# Patient Record
Sex: MALE | Race: WHITE | NOT HISPANIC OR LATINO | Employment: FULL TIME | URBAN - METROPOLITAN AREA
[De-identification: names, ages, dates, MRNs, and addresses within clinical notes are randomized per-mention and may not be internally consistent; named-entity substitution may affect disease eponyms.]

---

## 2021-07-23 ENCOUNTER — APPOINTMENT (OUTPATIENT)
Dept: CT IMAGING | Facility: HOSPITAL | Age: 36
End: 2021-07-23

## 2021-07-23 ENCOUNTER — APPOINTMENT (OUTPATIENT)
Dept: ULTRASOUND IMAGING | Facility: HOSPITAL | Age: 36
End: 2021-07-23

## 2021-07-23 ENCOUNTER — HOSPITAL ENCOUNTER (EMERGENCY)
Facility: HOSPITAL | Age: 36
Discharge: HOME | End: 2021-07-23
Attending: EMERGENCY MEDICINE | Admitting: EMERGENCY MEDICINE

## 2021-07-23 VITALS
OXYGEN SATURATION: 98 % | DIASTOLIC BLOOD PRESSURE: 97 MMHG | SYSTOLIC BLOOD PRESSURE: 163 MMHG | TEMPERATURE: 98.9 F | WEIGHT: 285 LBS | BODY MASS INDEX: 36.57 KG/M2 | RESPIRATION RATE: 16 BRPM | HEIGHT: 74 IN | HEART RATE: 75 BPM

## 2021-07-23 DIAGNOSIS — L23.7 CONTACT DERMATITIS DUE TO RHUS TOXICODENDRON: ICD-10-CM

## 2021-07-23 DIAGNOSIS — N41.0 ACUTE PROSTATITIS: Primary | ICD-10-CM

## 2021-07-23 DIAGNOSIS — R82.81 PYURIA: ICD-10-CM

## 2021-07-23 LAB
BACTERIA UR QL AUTO: ABNORMAL /HPF
BILIRUB UR QL STRIP: NEGATIVE
CLARITY UR: CLEAR
COLOR UR: YELLOW
GLUCOSE UR STRIP-MCNC: NEGATIVE MG/DL
HGB UR QL STRIP.AUTO: ABNORMAL
HOLD SPECIMEN: NORMAL
HOLD SPECIMEN: NORMAL
HYALINE CASTS UR QL AUTO: ABNORMAL /LPF
KETONES UR QL STRIP: ABNORMAL
LEUKOCYTE ESTERASE UR QL STRIP.AUTO: ABNORMAL
NITRITE UR QL STRIP: NEGATIVE
PH UR STRIP.AUTO: 5.5 [PH] (ref 5–8)
PROT UR QL STRIP: NEGATIVE
RBC # UR: ABNORMAL /HPF
REF LAB TEST METHOD: ABNORMAL
SP GR UR STRIP: 1.02 (ref 1–1.03)
SQUAMOUS #/AREA URNS HPF: ABNORMAL /HPF
UROBILINOGEN UR QL STRIP: ABNORMAL
WBC UR QL AUTO: ABNORMAL /HPF
WHOLE BLOOD HOLD SPECIMEN: NORMAL

## 2021-07-23 PROCEDURE — 93976 VASCULAR STUDY: CPT

## 2021-07-23 PROCEDURE — 87186 SC STD MICRODIL/AGAR DIL: CPT | Performed by: EMERGENCY MEDICINE

## 2021-07-23 PROCEDURE — 87086 URINE CULTURE/COLONY COUNT: CPT | Performed by: EMERGENCY MEDICINE

## 2021-07-23 PROCEDURE — 87491 CHLMYD TRACH DNA AMP PROBE: CPT | Performed by: EMERGENCY MEDICINE

## 2021-07-23 PROCEDURE — 74176 CT ABD & PELVIS W/O CONTRAST: CPT

## 2021-07-23 PROCEDURE — 87591 N.GONORRHOEAE DNA AMP PROB: CPT | Performed by: EMERGENCY MEDICINE

## 2021-07-23 PROCEDURE — 76870 US EXAM SCROTUM: CPT

## 2021-07-23 PROCEDURE — 51798 US URINE CAPACITY MEASURE: CPT

## 2021-07-23 PROCEDURE — 25010000002 CEFTRIAXONE PER 250 MG: Performed by: EMERGENCY MEDICINE

## 2021-07-23 PROCEDURE — 96372 THER/PROPH/DIAG INJ SC/IM: CPT

## 2021-07-23 PROCEDURE — 87077 CULTURE AEROBIC IDENTIFY: CPT | Performed by: EMERGENCY MEDICINE

## 2021-07-23 PROCEDURE — 81001 URINALYSIS AUTO W/SCOPE: CPT | Performed by: EMERGENCY MEDICINE

## 2021-07-23 PROCEDURE — 99283 EMERGENCY DEPT VISIT LOW MDM: CPT

## 2021-07-23 RX ORDER — SODIUM CHLORIDE 0.9 % (FLUSH) 0.9 %
10 SYRINGE (ML) INJECTION AS NEEDED
Status: DISCONTINUED | OUTPATIENT
Start: 2021-07-23 | End: 2021-07-23 | Stop reason: HOSPADM

## 2021-07-23 RX ORDER — KETOROLAC TROMETHAMINE 10 MG/1
10 TABLET, FILM COATED ORAL EVERY 6 HOURS PRN
Qty: 20 TABLET | Refills: 0 | Status: SHIPPED | OUTPATIENT
Start: 2021-07-23 | End: 2022-03-02

## 2021-07-23 RX ORDER — CIPROFLOXACIN 500 MG/1
500 TABLET, FILM COATED ORAL 2 TIMES DAILY
Qty: 28 TABLET | Refills: 0 | Status: SHIPPED | OUTPATIENT
Start: 2021-07-23 | End: 2022-03-02

## 2021-07-23 RX ORDER — PHENAZOPYRIDINE HYDROCHLORIDE 200 MG/1
200 TABLET, FILM COATED ORAL 3 TIMES DAILY PRN
Qty: 9 TABLET | Refills: 0 | Status: SHIPPED | OUTPATIENT
Start: 2021-07-23 | End: 2022-03-02

## 2021-07-23 RX ORDER — PREDNISONE 10 MG/1
TABLET ORAL
Qty: 35 TABLET | Refills: 0 | Status: SHIPPED | OUTPATIENT
Start: 2021-07-23 | End: 2022-03-02

## 2021-07-23 RX ADMIN — SODIUM CHLORIDE, POTASSIUM CHLORIDE, SODIUM LACTATE AND CALCIUM CHLORIDE 1000 ML: 600; 310; 30; 20 INJECTION, SOLUTION INTRAVENOUS at 12:10

## 2021-07-23 RX ADMIN — LIDOCAINE HYDROCHLORIDE 250 MG: 10 INJECTION, SOLUTION EPIDURAL; INFILTRATION; INTRACAUDAL; PERINEURAL at 13:21

## 2021-07-23 NOTE — ED PROVIDER NOTES
Subjective   The patient presents to the emergency department with 1 week history of testicular pain, difficulty with urination, and suprapubic discomfort. Patient has had a couple of urinalysis performed demonstrating no clear source cardiology. The patient does report a prior history of kidney stones. The patient is in a monogamous relationship for the last 2+ years. No penile discharge. No fever or chills. No chest pain or shortness of breath.  The patient also reports some rash on his chest abdomen and groin region after working out in the garden earlier in the week.  Patient is concerned for possible poison ivy.      History provided by:  Patient      Review of Systems   Constitutional: Negative.    Respiratory: Negative.    Cardiovascular: Negative.    Gastrointestinal: Negative.    Genitourinary: Positive for difficulty urinating, dysuria and testicular pain. Negative for discharge, penile pain and penile swelling.   Musculoskeletal: Negative.    Skin: Negative.    Psychiatric/Behavioral: Negative.    All other systems reviewed and are negative.      Past Medical History:   Diagnosis Date   • Lumbar sprain        Allergies   Allergen Reactions   • Hydrocodone Other (See Comments) and GI Intolerance     vomiting   • Oxycodone Nausea And Vomiting       Past Surgical History:   Procedure Laterality Date   • TONSILLECTOMY         Family History   Problem Relation Age of Onset   • Hypertension Father        Social History     Socioeconomic History   • Marital status:      Spouse name: Not on file   • Number of children: Not on file   • Years of education: Not on file   • Highest education level: Not on file   Tobacco Use   • Smoking status: Never Smoker   • Smokeless tobacco: Never Used   Vaping Use   • Vaping Use: Never used   Substance and Sexual Activity   • Alcohol use: Yes     Comment: OCC   • Drug use: Never   • Sexual activity: Yes           Objective   Physical Exam  Vitals and nursing note  reviewed.   Constitutional:       Appearance: Normal appearance. He is obese.   Cardiovascular:      Rate and Rhythm: Normal rate and regular rhythm.      Pulses: Normal pulses.   Pulmonary:      Effort: Pulmonary effort is normal. No respiratory distress.      Breath sounds: Normal breath sounds.   Abdominal:      Palpations: Abdomen is soft.      Tenderness: There is no guarding.      Comments: Mild suprapubic tenderness. No guarding or surgical signs.   Genitourinary:     Comments: Normal external circumcised male genitalia. Bilateral testicular tenderness. No palpable hernias. No discharge.  Musculoskeletal:         General: Normal range of motion.   Skin:     General: Skin is warm and dry.      Comments: Patchy Rhus Toxicodendron rash of the chest, abdomen, and groin region.   Neurological:      General: No focal deficit present.      Mental Status: He is alert and oriented to person, place, and time.   Psychiatric:         Mood and Affect: Mood normal.         Behavior: Behavior normal.         Procedures           ED Course  ED Course as of Jul 23 1335 Fri Jul 23, 2021   1117 Leukocytes, UA(!): Moderate (2+) [RS]   1117 WBC, UA(!): Too Numerous to Count [RS]   1313 No emergent findings on her evaluation here in the ER.  Significant pyuria.  Potential for prostatitis.  We will plan to discharge patient home with symptomatic antibiotic management.  He is to follow-up with his doctor.  He is to return to the ER for any concerns or worsening.    [RS]      ED Course User Index  [RS] Lauro Horner MD                                           MDM  Number of Diagnoses or Management Options  Acute prostatitis  Contact dermatitis due to rhus toxicodendron  Pyuria  Diagnosis management comments: Recent Results (from the past 24 hour(s))  -POC Urinalysis Dipstick, Multipro (Automated dipstick)  Collection Time: 07/23/21  9:40 AM  Specimen: Urine       Result                      Value             Ref Range            Color                       Yellow            Yellow, Stra*       Clarity, UA                 Clear             Clear               Glucose, UA                 Negative          Negative, 10*       Bilirubin                   Negative          Negative            Ketones, UA                 Negative          Negative            Specific Gravity            1.030             1.005 - 1.030       Blood, UA                   Trace (A)         Negative            pH, Urine                   6.0               5.0 - 8.0           Protein, POC                Negative          Negative mg/*       Urobilinogen, UA            Normal            Normal              Nitrite, UA                 Negative          Negative            Leukocytes                  Negative          Negative       -Urinalysis With Microscopic If Indicated (No Culture) - Urine, Clean Catch  Collection Time: 07/23/21 10:23 AM  Specimen: Urine, Clean Catch       Result                      Value             Ref Range           Color, UA                   Yellow            Yellow, Straw       Appearance, UA              Clear             Clear               pH, UA                      5.5               5.0 - 8.0           Specific [place], UA        1.025             1.001 - 1.030       Glucose, UA                 Negative          Negative            Ketones, UA                 Trace (A)         Negative            Bilirubin, UA               Negative          Negative            Blood, UA                   Trace (A)         Negative            Protein, UA                 Negative          Negative            Leuk Esterase, UA                             Negative        Moderate (2+) (A)       Nitrite, UA                 Negative          Negative            Urobilinogen, UA            1.0 E.U./dL       0.2 - 1.0 E.*  -Urinalysis, Microscopic Only - Urine, Clean Catch  Collection Time: 07/23/21 10:23 AM  Specimen: Urine, Clean Catch       Result                       Value             Ref Range           RBC, UA                     0-2               None Seen, 0*       WBC, UA                                       None Seen, 0*   Too Numerous to Count (A)       Bacteria, UA                Trace             None Seen, T*       Squamous Epithelial Ce*     None Seen         None Seen, 0*       Hyaline Casts, UA           21-30             0 - 6 /LPF          Methodology                                                   Automated Microscopy  -Green Top (Gel)  Collection Time: 07/23/21 10:29 AM       Result                      Value             Ref Range           Extra Tube                                                    Hold for add-ons.  -Lavender Top  Collection Time: 07/23/21 10:29 AM       Result                      Value             Ref Range           Extra Tube                                                    hold for add-on  -Gold Top - SST  Collection Time: 07/23/21 10:29 AM       Result                      Value             Ref Range           Extra Tube                                                    Hold for add-ons.  Note: In addition to lab results from this visit, the labs listed above may include labs taken at another facility or during a different encounter within the last 24 hours. Please correlate lab times with ED admission and discharge times for further clarification of the services performed during this visit.    CT Abdomen Pelvis Without Contrast   Preliminary Result    1. No obstructing uropathy or urolithiasis/nephrolithiasis.    2. Small fat-containing left direct inguinal hernia.         D:  07/23/2021    E:  07/23/2021               US Testicular or Ovarian Vascular Limited   Preliminary Result    No intratesticular ischemia or hyperemia to suggest acute    inflammation with small left hydrocele.          D:  07/23/2021    E:  07/23/2021               US Scrotum & Testicles   Preliminary Result    No intratesticular ischemia  "or hyperemia to suggest acute    inflammation with small left hydrocele.          D:  07/23/2021    E:  07/23/2021               ------------------------------------------------------------               07/23/21 07/23/21 07/23/21 07/23/21                  1005          1030      1100      1230     ------------------------------------------------------------   BP:        (!) 143/121     139/96    160/94    163/97     Pulse:         75                                         Resp:          16                                         Temp:   98.9 °F (37.2 °C)                                 TempSrc:      Oral                                        SpO2:          99%          98%       96%       98%       Weight:  129 kg (285 lb)                                  Height:   188 cm (74\")                                   ------------------------------------------------------------  Medications  sodium chloride 0.9 % flush 10 mL (has no administration in time range)  sodium chloride 0.9 % flush 10 mL (has no administration in time range)  lactated ringers bolus 1,000 mL (0 mL Intravenous Stopped 7/23/21 1304)  cefTRIAXone (ROCEPHIN) 250 mg/ml in lidocaine 1% IM syringe (250 mg vial) (250 mg Intramuscular Given 7/23/21 1321)  ECG/EMG Results (last 24 hours)     ** No results found for the last 24 hours. **      No orders to display         Amount and/or Complexity of Data Reviewed  Clinical lab tests: reviewed  Tests in the radiology section of CPT®: reviewed  Independent visualization of images, tracings, or specimens: yes        Final diagnoses:   Acute prostatitis   Pyuria   Contact dermatitis due to rhus toxicodendron       ED Disposition  ED Disposition     ED Disposition Condition Comment    Discharge Stable           [facility] Emergency Department  [address] [address] [phone]    As needed, " If symptoms worsen or ANY concerns.    PATIENT CONNECTION - [facility] [address]  [phone]  Schedule an appointment as soon as possible for a visit       Rusty Ledbetter MD  [facility] [place] [place] [address]  [phone]    In 1 week  If not better/resolved.         Medication List      New Prescriptions    ciprofloxacin 500 MG tablet  Commonly known as: CIPRO  Take 1 tablet by mouth 2 (Two) Times a Day.     ketorolac 10 MG tablet  Commonly known as: TORADOL  Take 1 tablet by mouth Every 6 (Six) Hours As Needed for Moderate Pain . Received a dose in the ER.     phenazopyridine 200 MG tablet  Commonly known as: PYRIDIUM  Take 1 tablet by mouth 3 (Three) Times a Day As Needed for Bladder Spasms.     predniSONE 10 MG tablet  Commonly known as: DELTASONE  40mg PO daily for 5 days, then 20mg PO daily for 5 days, then 10mg PO daily for 5 days        Changed    albuterol (2.5 MG/3ML) 0.083% nebulizer solution  Commonly known as: PROVENTIL  What changed: Another medication with the same name was removed. Continue taking this medication, and follow the directions you see here.           Where to Get Your Medications      These medications were sent to [id number] - [place], [place] - [address] - [phone]  - [phone]   [address] [address]    Phone: [phone]   · ciprofloxacin 500 MG tablet  · ketorolac 10 MG tablet  · phenazopyridine 200 MG tablet  · predniSONE 10 MG tablet          Lauro Horner MD  07/23/21 2235

## 2021-07-25 LAB — BACTERIA SPEC AEROBE CULT: ABNORMAL

## 2021-07-26 LAB
C TRACH RRNA SPEC QL NAA+PROBE: NEGATIVE
N GONORRHOEA RRNA SPEC QL NAA+PROBE: NEGATIVE

## 2022-03-02 ENCOUNTER — TELEPHONE (OUTPATIENT)
Dept: INTERNAL MEDICINE | Facility: CLINIC | Age: 37
End: 2022-03-02

## 2022-03-02 ENCOUNTER — OFFICE VISIT (OUTPATIENT)
Dept: INTERNAL MEDICINE | Facility: CLINIC | Age: 37
End: 2022-03-02

## 2022-03-02 ENCOUNTER — PATIENT MESSAGE (OUTPATIENT)
Dept: INTERNAL MEDICINE | Facility: CLINIC | Age: 37
End: 2022-03-02

## 2022-03-02 ENCOUNTER — LAB (OUTPATIENT)
Dept: LAB | Facility: HOSPITAL | Age: 37
End: 2022-03-02

## 2022-03-02 VITALS
SYSTOLIC BLOOD PRESSURE: 138 MMHG | OXYGEN SATURATION: 98 % | DIASTOLIC BLOOD PRESSURE: 96 MMHG | HEART RATE: 70 BPM | TEMPERATURE: 97.5 F | BODY MASS INDEX: 40.57 KG/M2 | WEIGHT: 315 LBS

## 2022-03-02 DIAGNOSIS — M54.50 CHRONIC MIDLINE LOW BACK PAIN WITHOUT SCIATICA: ICD-10-CM

## 2022-03-02 DIAGNOSIS — G89.29 CHRONIC MIDLINE LOW BACK PAIN WITHOUT SCIATICA: ICD-10-CM

## 2022-03-02 DIAGNOSIS — M19.90 ARTHRITIS: Primary | ICD-10-CM

## 2022-03-02 DIAGNOSIS — I10 PRIMARY HYPERTENSION: ICD-10-CM

## 2022-03-02 DIAGNOSIS — Z11.59 NEED FOR HEPATITIS C SCREENING TEST: ICD-10-CM

## 2022-03-02 DIAGNOSIS — M19.90 ARTHRITIS: ICD-10-CM

## 2022-03-02 DIAGNOSIS — I10 PRIMARY HYPERTENSION: Primary | ICD-10-CM

## 2022-03-02 LAB
BASOPHILS # BLD AUTO: 0.03 10*3/MM3 (ref 0–0.2)
BASOPHILS NFR BLD AUTO: 0.4 % (ref 0–1.5)
DEPRECATED RDW RBC AUTO: 44.2 FL (ref 37–54)
EOSINOPHIL # BLD AUTO: 0.18 10*3/MM3 (ref 0–0.4)
EOSINOPHIL NFR BLD AUTO: 2.5 % (ref 0.3–6.2)
ERYTHROCYTE [DISTWIDTH] IN BLOOD BY AUTOMATED COUNT: 13.5 % (ref 12.3–15.4)
ERYTHROCYTE [SEDIMENTATION RATE] IN BLOOD: 13 MM/HR (ref 0–15)
HCT VFR BLD AUTO: 49.9 % (ref 37.5–51)
HGB BLD-MCNC: 16.8 G/DL (ref 13–17.7)
IMM GRANULOCYTES # BLD AUTO: 0.02 10*3/MM3 (ref 0–0.05)
IMM GRANULOCYTES NFR BLD AUTO: 0.3 % (ref 0–0.5)
LYMPHOCYTES # BLD AUTO: 2.15 10*3/MM3 (ref 0.7–3.1)
LYMPHOCYTES NFR BLD AUTO: 29.4 % (ref 19.6–45.3)
MCH RBC QN AUTO: 30.2 PG (ref 26.6–33)
MCHC RBC AUTO-ENTMCNC: 33.7 G/DL (ref 31.5–35.7)
MCV RBC AUTO: 89.6 FL (ref 79–97)
MONOCYTES # BLD AUTO: 0.67 10*3/MM3 (ref 0.1–0.9)
MONOCYTES NFR BLD AUTO: 9.2 % (ref 5–12)
NEUTROPHILS NFR BLD AUTO: 4.26 10*3/MM3 (ref 1.7–7)
NEUTROPHILS NFR BLD AUTO: 58.2 % (ref 42.7–76)
NRBC BLD AUTO-RTO: 0 /100 WBC (ref 0–0.2)
PLATELET # BLD AUTO: 263 10*3/MM3 (ref 140–450)
PMV BLD AUTO: 10 FL (ref 6–12)
RBC # BLD AUTO: 5.57 10*6/MM3 (ref 4.14–5.8)
WBC NRBC COR # BLD: 7.31 10*3/MM3 (ref 3.4–10.8)

## 2022-03-02 PROCEDURE — 99204 OFFICE O/P NEW MOD 45 MIN: CPT | Performed by: FAMILY MEDICINE

## 2022-03-02 PROCEDURE — 80053 COMPREHEN METABOLIC PANEL: CPT | Performed by: FAMILY MEDICINE

## 2022-03-02 PROCEDURE — 84550 ASSAY OF BLOOD/URIC ACID: CPT | Performed by: FAMILY MEDICINE

## 2022-03-02 PROCEDURE — 81374 HLA I TYPING 1 ANTIGEN LR: CPT | Performed by: FAMILY MEDICINE

## 2022-03-02 PROCEDURE — 80061 LIPID PANEL: CPT | Performed by: FAMILY MEDICINE

## 2022-03-02 PROCEDURE — 36415 COLL VENOUS BLD VENIPUNCTURE: CPT

## 2022-03-02 PROCEDURE — 85025 COMPLETE CBC W/AUTO DIFF WBC: CPT | Performed by: FAMILY MEDICINE

## 2022-03-02 PROCEDURE — 85652 RBC SED RATE AUTOMATED: CPT | Performed by: FAMILY MEDICINE

## 2022-03-02 PROCEDURE — 86431 RHEUMATOID FACTOR QUANT: CPT | Performed by: FAMILY MEDICINE

## 2022-03-02 PROCEDURE — 84443 ASSAY THYROID STIM HORMONE: CPT | Performed by: FAMILY MEDICINE

## 2022-03-02 PROCEDURE — 86803 HEPATITIS C AB TEST: CPT | Performed by: FAMILY MEDICINE

## 2022-03-02 PROCEDURE — 86038 ANTINUCLEAR ANTIBODIES: CPT | Performed by: FAMILY MEDICINE

## 2022-03-02 RX ORDER — LISINOPRIL 10 MG/1
10 TABLET ORAL DAILY
Qty: 30 TABLET | Refills: 1 | Status: SHIPPED | OUTPATIENT
Start: 2022-03-02 | End: 2022-03-02

## 2022-03-02 RX ORDER — LOSARTAN POTASSIUM 25 MG/1
25 TABLET ORAL DAILY
Qty: 30 TABLET | Refills: 1 | Status: SHIPPED | OUTPATIENT
Start: 2022-03-02 | End: 2022-03-30 | Stop reason: DRUGHIGH

## 2022-03-02 NOTE — TELEPHONE ENCOUNTER
Caller: Serge Narayanan    Relationship: Self    Best call back number: [phone]    What was the call regarding:   PATIENT STATED THAT HE WAS SEEN IN THE OFFICE TODAY 03/02/2022 AND PRESCRIBED MEDICATION   lisinopril (PRINIVIL,ZESTRIL) 10 MG tablet  PATIENT STATED THAT THIS MEDICATION WAS PRESCRIBED TO HIS MOTHER AND BROTHER THAT BOTH HAVE ASTHMA AS WELL AND THIS MEDICATION CAUSED THEM COUGH A LOT AND PATIENT WOULD LIKE FOR THIS MEDICATION TO BE CHANGED TO A DIFFERENT MEDICATION IF POSSIBLE     Do you require a callback: YES

## 2022-03-02 NOTE — PROGRESS NOTES
Subjective   Serge Narayanan is a 36 y.o. male.     Chief Complaint   Patient presents with   • Hypertension     ESTABLISH CARE.  Needs lab to check for AS.  currently seeing ortho for back injury.   • Back Injury       Visit Vitals  /96 (BP Location: Left arm, Patient Position: Sitting, Cuff Size: Large Adult)   Pulse 70   Temp 97.5 °F (36.4 °C)   Wt (!) 143 kg (316 lb)   SpO2 98%   BMI 40.57 kg/m²       Vitals:    03/02/22 1019 03/02/22 1048   BP: 128/90 138/96   BP Location:  Left arm   Patient Position:  Sitting   Cuff Size:  Large Adult   Pulse: 70    Temp: 97.5 °F (36.4 °C)    SpO2: 98%    Weight: (!) 143 kg (316 lb)      Wt Readings from Last 3 Encounters:   03/02/22 (!) 143 kg (316 lb)   07/23/21 129 kg (285 lb)   07/23/21 129 kg (285 lb)       History of Present Illness   Pt here to establish care  Pt as been told at multiple locations that his blood pressure is elevated.     Pt donates blood and plasma and his blood pressure is high there.  Pt had back injury at work and Dr Sparks his ortho recommended testing for Ankylosing Spondylitis.  Pt has pain in back, knees, hips and ankles. Pt goes to [facility] and sees 4 of the doctors there. Pt has high blood pressure at all his ortho visits.     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 Pt wears hearing aids.   The following portions of the patient's history were reviewed and updated as appropriate: allergies, current medications, past family history, past medical history, past social history, past surgical history and problem list.    Past Medical History:   Diagnosis Date   • Arthritis    • Asthma    • Kidney stone    • Lumbar sprain    • Personal history of COVID-19 01/2021      Past Surgical History:   Procedure Laterality Date   • TONSILLECTOMY  2011      Family History   Problem Relation Age of Onset   • Hypertension Father    • Heart attack Father         related to  Covid   • Migraine headaches Mother       Social History     Socioeconomic History   • Marital status:    Tobacco Use   • Smoking status: Never Smoker   • Smokeless tobacco: Never Used   Vaping Use   • Vaping Use: Never used   Substance and Sexual Activity   • Alcohol use: Yes     Comment: OCC   • Drug use: Never   • Sexual activity: Yes      Allergies   Allergen Reactions   • Hydrocodone Other (See Comments) and GI Intolerance     vomiting   • Oxycodone Nausea And Vomiting       Review of Systems   HENT: Positive for hearing loss and tinnitus.    Musculoskeletal: Positive for arthralgias and back pain.       Objective   Physical Exam  Vitals and nursing note reviewed.   Constitutional:       Appearance: He is well-developed.   HENT:      Head: Normocephalic.      Right Ear: External ear normal.      Left Ear: External ear normal.      Ears:      Comments: Bilateral hearing aids     Nose: Nose normal.   Eyes:      General: Lids are normal.      Conjunctiva/sclera: Conjunctivae normal.      Pupils: Pupils are equal, round, and reactive to light.   Neck:      Thyroid: No thyroid mass or thyromegaly.      Vascular: No carotid bruit.      Trachea: Trachea normal.   Cardiovascular:      Rate and Rhythm: Normal rate and regular rhythm.      Heart sounds: No murmur heard.      Pulmonary:      Effort: Pulmonary effort is normal. No respiratory distress.      Breath sounds: Normal breath sounds. No decreased breath sounds, wheezing, rhonchi or rales.   Chest:      Chest wall: No tenderness.   Abdominal:      General: Bowel sounds are normal.      Palpations: Abdomen is soft.      Tenderness: There is no abdominal tenderness.   Musculoskeletal:         General: Normal range of motion.      Cervical back: Normal range of motion and neck supple.      Lumbar back: Tenderness and bony tenderness present.        Back:    Skin:     General: Skin is warm and dry.   Neurological:      Mental Status: He is alert and oriented to  person, place, and time.   Psychiatric:         Behavior: Behavior normal.         Assessment/Plan   Diagnoses and all orders for this visit:    1. Arthritis (Primary)  -     HLA-B27 Antigen; Future  -     Sedimentation Rate; Future  -     Uric Acid; Future  -     Rheumatoid Factor; Future  -     Nuclear Antigen Antibody, IFA; Future  -     CBC & Differential; Future    2. Chronic midline low back pain without sciatica  -     HLA-B27 Antigen; Future  -     Sedimentation Rate; Future  -     Uric Acid; Future  -     Rheumatoid Factor; Future  -     Nuclear Antigen Antibody, IFA; Future  -     CBC & Differential; Future  -     Comprehensive Metabolic Panel; Future    3. Primary hypertension  -     CBC & Differential; Future  -     Comprehensive Metabolic Panel; Future  -     TSH Rfx On Abnormal To Free T4; Future  -     Lipid Panel; Future  -     lisinopril (PRINIVIL,ZESTRIL) 10 MG tablet; Take 1 tablet by mouth Daily.  Dispense: 30 tablet; Refill: 1    4. Need for hepatitis C screening test  -     HCV Antibody Rfx To Qnt PCR; Future      Start lisinopril 10 mg daily.  Watch for cough.  Will check lab for  hypertension as well as for arthritis and back pain.             Current Outpatient Medications:   •  albuterol (PROVENTIL) (2.5 MG/3ML) 0.083% nebulizer solution, , Disp: , Rfl:   •  lisinopril (PRINIVIL,ZESTRIL) 10 MG tablet, Take 1 tablet by mouth Daily., Disp: 30 tablet, Rfl: 1    Return in about 4 weeks (around 3/30/2022), or if symptoms worsen or fail to improve, for Recheck bp.

## 2022-03-02 NOTE — PATIENT INSTRUCTIONS
Sacroiliac Joint Dysfunction    Sacroiliac joint dysfunction is a condition that causes inflammation on one or both sides of the sacroiliac (SI) joint. The SI joint is the joint between two bones of the pelvis called the sacrum and the ilium. The sacrum is the bone at the base of the spine. The ilium is the large bone that forms the hip. This condition causes deep aching or burning pain in the low back. In some cases, the pain may also spread into one or both buttocks, hips, or thighs.  What are the causes?  This condition may be caused by:  · Pregnancy. During pregnancy, extra stress is put on the SI joints because the pelvis widens.  · Injury, such as:  ? Injuries from car crashes.  ? Sports-related injuries.  ? Work-related injuries.  · Having one leg that is shorter than the other.  · Conditions that affect the joints, such as:  ? Rheumatoid arthritis.  ? Gout.  ? Psoriatic arthritis.  ? Joint infection (septic arthritis).  Sometimes, the cause of SI joint dysfunction is not known.  What are the signs or symptoms?  Symptoms of this condition include:  · Aching or burning pain in the lower back. The pain may also spread to other areas, such as:  ? Buttocks.  ? Groin.  ? Thighs.  · Muscle spasms in or around the painful areas.  · Increased pain when standing, walking, running, stair climbing, bending, or lifting.  How is this diagnosed?  This condition is diagnosed with a physical exam and your medical history. During the exam, the health care provider may move one or both of your legs to different positions to check for pain. Various tests may be done to confirm the diagnosis, including:  · Imaging tests to look for other causes of pain. These may include:  ? MRI.  ? CT scan.  ? Bone scan.  · Diagnostic injection. A numbing medicine is injected into the SI joint using a needle. If your pain is temporarily improved or stopped after the injection, this can indicate that SI joint dysfunction is the problem.  How is  this treated?  Treatment depends on the cause and severity of your condition. Treatment options can be noninvasive and may include:  · Ice or heat applied to the lower back area after an injury. This may help reduce pain and muscle spasms.  · Medicines to relieve pain or inflammation or to relax the muscles.  · Wearing a back brace (sacroiliac brace) to help support the joint while your back is healing.  · Physical therapy to increase muscle strength around the joint and flexibility at the joint. This may also involve learning proper body positions and ways of moving to relieve stress on the joint.  · Direct manipulation of the SI joint.  · Use of a device that provides electrical stimulation to help reduce pain at the joint.  Other treatments may include:  · Injections of steroid medicine into the joint to reduce pain and swelling.  · Radiofrequency ablation. This treatment uses heat to burn away nerves that are carrying pain messages from the joint.  · Surgery to put in screws and plates that limit or prevent joint motion. This is rare.  Follow these instructions at home:  Medicines  · Take over-the-counter and prescription medicines only as told by your health care provider.  · Ask your health care provider if the medicine prescribed to you:  ? Requires you to avoid driving or using machinery.  ? Can cause constipation. You may need to take these actions to prevent or treat constipation:  § Drink enough fluid to keep your urine pale yellow.  § Take over-the-counter or prescription medicines.  § Eat foods that are high in fiber, such as beans, whole grains, and fresh fruits and vegetables.  § Limit foods that are high in fat and processed sugars, such as fried or sweet foods.  If you have a brace:  · Wear the brace as told by your health care provider. Remove it only as told by your health care provider.  · Keep the brace clean.  · If the brace is not waterproof:  ? Do not let it get wet.  ? Cover it with a  watertight covering when you take a bath or a shower.  Managing pain, stiffness, and swelling         · Icing can help with pain and swelling. Heat may help with muscle tension or spasms. Ask your health care provider if you should use ice or heat.  · If directed, put ice on the affected area:  ? If you have a removable brace, remove it as told by your health care provider.  ? Put ice in a plastic bag.  ? Place a towel between your skin and the bag.  ? Leave the ice on for 20 minutes, 2-3 times a day.  ? Remove the ice if your skin turns bright red. This is very important. If you cannot feel pain, heat, or cold, you have a greater risk of damage to the area.  · If directed, apply heat to the affected area as often as told by your health care provider. Use the heat source that your health care provider recommends, such as a moist heat pack or a heating pad.  ? Place a towel between your skin and the heat source.  ? Leave the heat on for 20-30 minutes.  ? Remove the heat if your skin turns bright red. This is especially important if you are unable to feel pain, heat, or cold. You may have a greater risk of getting burned.  General instructions  · Rest as needed. Return to your normal activities as told by your health care provider. Ask your health care provider what activities are safe for you.  · Do exercises as told by your health care provider or physical therapist.  · Keep all follow-up visits. This is important.  Contact a health care provider if:  · Your pain is not controlled with medicine.  · You have a fever.  · Your pain is getting worse.  Get help right away if:  · You have weakness, numbness, or tingling in your legs or feet.  · You lose control of your bladder or bowels.  Summary  · Sacroiliac (SI) joint dysfunction is a condition that causes inflammation on one or both sides of the SI joint.  · This condition causes deep aching or burning pain in the low back. In some cases, the pain may also spread into  one or both buttocks, hips, or thighs.  · Treatment depends on the cause and severity of your condition. It may include medicines to reduce pain and swelling or to relax muscles.  This information is not intended to replace advice given to you by your health care provider. Make sure you discuss any questions you have with your health care provider.  Document Revised: 04/29/2021 Document Reviewed: 04/29/2021  [employer]vier Patient Education © 2021 [employer]

## 2022-03-02 NOTE — TELEPHONE ENCOUNTER
Kinsey Ervin 3/2/2022 5:48 PM EST      ----- Message -----  From: Serge Narayanan  Sent: 3/2/2022 5:28 PM EST  To: [facility] [place] [address] Clinical Pool  Subject: Changing Prescription     I don’t know if you were able to receive the message I had called in regarding the coughing issue with the medication I was prescribed for my blood pressure. My Mother and Brother both took it at first and it was a horrible experience for them since they had/have asthma. Is there a way to just negate the one prescribed to me and change it to a different type so that I don’t have the coughing fits?     Thank you     Kodi

## 2022-03-03 ENCOUNTER — PATIENT ROUNDING (BHMG ONLY) (OUTPATIENT)
Dept: INTERNAL MEDICINE | Facility: CLINIC | Age: 37
End: 2022-03-03

## 2022-03-03 LAB
ALBUMIN SERPL-MCNC: 4.6 G/DL (ref 3.5–5.2)
ALBUMIN/GLOB SERPL: 1.6 G/DL
ALP SERPL-CCNC: 111 U/L (ref 39–117)
ALT SERPL W P-5'-P-CCNC: 36 U/L (ref 1–41)
ANION GAP SERPL CALCULATED.3IONS-SCNC: 14.4 MMOL/L (ref 5–15)
AST SERPL-CCNC: 26 U/L (ref 1–40)
BILIRUB SERPL-MCNC: 0.7 MG/DL (ref 0–1.2)
BUN SERPL-MCNC: 12 MG/DL (ref 6–20)
BUN/CREAT SERPL: 12.1 (ref 7–25)
CALCIUM SPEC-SCNC: 9.8 MG/DL (ref 8.6–10.5)
CHLORIDE SERPL-SCNC: 104 MMOL/L (ref 98–107)
CHOLEST SERPL-MCNC: 232 MG/DL (ref 0–200)
CHROMATIN AB SERPL-ACNC: <10 IU/ML (ref 0–14)
CO2 SERPL-SCNC: 22.6 MMOL/L (ref 22–29)
CREAT SERPL-MCNC: 0.99 MG/DL (ref 0.76–1.27)
EGFRCR SERPLBLD CKD-EPI 2021: 102.5 ML/MIN/1.73
GLOBULIN UR ELPH-MCNC: 2.9 GM/DL
GLUCOSE SERPL-MCNC: 98 MG/DL (ref 65–99)
HCV AB S/CO SERPL IA: <0.1 S/CO RATIO (ref 0–0.9)
HCV AB SERPL QL IA: NORMAL
HDLC SERPL-MCNC: 51 MG/DL (ref 40–60)
LDLC SERPL CALC-MCNC: 158 MG/DL (ref 0–100)
LDLC/HDLC SERPL: 3.05 {RATIO}
POTASSIUM SERPL-SCNC: 4.5 MMOL/L (ref 3.5–5.2)
PROT SERPL-MCNC: 7.5 G/DL (ref 6–8.5)
SODIUM SERPL-SCNC: 141 MMOL/L (ref 136–145)
TRIGL SERPL-MCNC: 127 MG/DL (ref 0–150)
TSH SERPL DL<=0.05 MIU/L-ACNC: 1.05 UIU/ML (ref 0.27–4.2)
URATE SERPL-MCNC: 7.4 MG/DL (ref 3.4–7)
VLDLC SERPL-MCNC: 23 MG/DL (ref 5–40)

## 2022-03-03 NOTE — PROGRESS NOTES
A eMindful message has been sent to the patient for patient rounding with [facility]

## 2022-03-06 LAB — ANA TITR SER IF: NEGATIVE {TITER}

## 2022-03-11 LAB — HLA-B27 QL NAA+PROBE: NEGATIVE

## 2022-03-30 ENCOUNTER — OFFICE VISIT (OUTPATIENT)
Dept: INTERNAL MEDICINE | Facility: CLINIC | Age: 37
End: 2022-03-30

## 2022-03-30 VITALS
HEART RATE: 80 BPM | SYSTOLIC BLOOD PRESSURE: 140 MMHG | WEIGHT: 315 LBS | HEIGHT: 74 IN | DIASTOLIC BLOOD PRESSURE: 90 MMHG | BODY MASS INDEX: 40.43 KG/M2 | TEMPERATURE: 98 F | OXYGEN SATURATION: 98 %

## 2022-03-30 DIAGNOSIS — I10 PRIMARY HYPERTENSION: Primary | ICD-10-CM

## 2022-03-30 DIAGNOSIS — E66.01 CLASS 3 SEVERE OBESITY WITH SERIOUS COMORBIDITY AND BODY MASS INDEX (BMI) OF 40.0 TO 44.9 IN ADULT, UNSPECIFIED OBESITY TYPE: ICD-10-CM

## 2022-03-30 DIAGNOSIS — E78.00 PURE HYPERCHOLESTEROLEMIA: ICD-10-CM

## 2022-03-30 PROCEDURE — 99214 OFFICE O/P EST MOD 30 MIN: CPT | Performed by: FAMILY MEDICINE

## 2022-03-30 RX ORDER — LOSARTAN POTASSIUM 50 MG/1
50 TABLET ORAL DAILY
Qty: 30 TABLET | Refills: 2 | Status: SHIPPED | OUTPATIENT
Start: 2022-03-30 | End: 2022-06-27

## 2022-03-30 NOTE — PROGRESS NOTES
"Subjective   Serge Narayanan is a 36 y.o. male.     Chief Complaint   Patient presents with   • Hypertension              Visit Vitals  /90 (BP Location: Left arm, Patient Position: Sitting, Cuff Size: Large Adult)   Pulse 80   Temp 98 °F (36.7 °C)   Ht 188 cm (74\")   Wt (!) 145 kg (319 lb)   SpO2 98%   BMI 40.96 kg/m²       Wt Readings from Last 3 Encounters:   03/30/22 (!) 145 kg (319 lb)   03/02/22 (!) 143 kg (316 lb)   07/23/21 129 kg (285 lb)       Hypertension  This is a recurrent problem. The current episode started 1 to 4 weeks ago. The problem is unchanged. The problem is controlled. Associated symptoms include headaches and malaise/fatigue. Pertinent negatives include no anxiety, blurred vision, chest pain, neck pain, orthopnea, palpitations, peripheral edema, PND, shortness of breath or sweats. There are no associated agents to hypertension. Risk factors for coronary artery disease include stress. Current antihypertension treatment includes angiotensin blockers. The current treatment provides moderate improvement. There are no compliance problems.  There is no history of angina, kidney disease, CAD/MI, CVA, heart failure, left ventricular hypertrophy, PVD or retinopathy. There is no history of sleep apnea or a thyroid problem.      Pt has been off work for back injury at work.  Pt has not been at work for 5 months and was on reduced hours since February.   Pt has been taking losartan without problem.     The following portions of the patient's history were reviewed and updated as appropriate: allergies, current medications, past family history, past medical history, past social history, past surgical history and problem list.    Past Medical History:   Diagnosis Date   • Arthritis    • Asthma    • Kidney stone    • Low back pain    • Lumbar sprain    • Personal history of COVID-19 01/2021   • Pure hypercholesterolemia 3/30/2022      Past Surgical History:   Procedure Laterality Date   • " TONSILLECTOMY  2011      Family History   Problem Relation Age of Onset   • Hypertension Father    • Heart attack Father         related to Covid   • Arthritis Father    • Migraine headaches Mother    • Arthritis Mother    • Asthma Mother       Social History     Socioeconomic History   • Marital status:    Tobacco Use   • Smoking status: Never Smoker   • Smokeless tobacco: Never Used   Vaping Use   • Vaping Use: Never used   Substance and Sexual Activity   • Alcohol use: Yes     Comment: OCC   • Drug use: Never   • Sexual activity: Yes     Partners: Male     Birth control/protection: None      Allergies   Allergen Reactions   • Hydrocodone Other (See Comments) and GI Intolerance     vomiting   • Oxycodone Nausea And Vomiting       Review of Systems   Constitutional: Positive for malaise/fatigue.   Eyes: Negative for blurred vision.   Respiratory: Negative for shortness of breath.    Cardiovascular: Negative for chest pain, palpitations, orthopnea and PND.   Musculoskeletal: Negative for neck pain.   Neurological: Positive for headaches.       Objective   Physical Exam  Vitals and nursing note reviewed.   Constitutional:       Appearance: He is well-developed.   HENT:      Head: Normocephalic.      Right Ear: External ear normal.      Left Ear: External ear normal.      Nose: Nose normal.   Eyes:      General: Lids are normal.      Conjunctiva/sclera: Conjunctivae normal.      Pupils: Pupils are equal, round, and reactive to light.   Neck:      Thyroid: No thyroid mass or thyromegaly.      Vascular: No carotid bruit.      Trachea: Trachea normal.   Cardiovascular:      Rate and Rhythm: Normal rate and regular rhythm.      Heart sounds: No murmur heard.  Pulmonary:      Effort: Pulmonary effort is normal. No respiratory distress.      Breath sounds: Normal breath sounds. No decreased breath sounds, wheezing, rhonchi or rales.   Chest:      Chest wall: No tenderness.   Abdominal:      General: Bowel sounds are  normal.      Palpations: Abdomen is soft.      Tenderness: There is no abdominal tenderness.   Musculoskeletal:         General: Normal range of motion.      Cervical back: Normal range of motion and neck supple.   Skin:     General: Skin is warm and dry.   Neurological:      Mental Status: He is alert and oriented to person, place, and time.   Psychiatric:         Behavior: Behavior normal.         Assessment/Plan   Diagnoses and all orders for this visit:    1. Primary hypertension (Primary)  -     losartan (Cozaar) 50 MG tablet; Take 1 tablet by mouth Daily.  Dispense: 30 tablet; Refill: 2    2. Pure hypercholesterolemia    3. Class 3 severe obesity with serious comorbidity and body mass index (BMI) of 40.0 to 44.9 in adult, unspecified obesity type (HCC)        Patient's (Body mass index is 40.96 kg/m².) indicates that they are morbidly obese (BMI > 40 or > 35 with obesity - related health condition) with health related conditions that include hypertension, dyslipidemias and osteoarthritis . Weight is newly identified. BMI is is above average; BMI management plan is completed. We discussed portion control and increasing exercise.     Handout on Mediterranean diet, DASH diet and calorie counting.      Increase losartan to 50 mg daily from 25 mg    Current Outpatient Medications:   •  albuterol (PROVENTIL) (2.5 MG/3ML) 0.083% nebulizer solution, , Disp: , Rfl:   •  losartan (Cozaar) 50 MG tablet, Take 1 tablet by mouth Daily., Disp: 30 tablet, Rfl: 2    Return in about 3 months (around 6/30/2022), or if symptoms worsen or fail to improve, for Recheck bp with nurse in 2 weeks.     Recent Results (from the past 1008 hour(s))   HLA-B27 Antigen    Collection Time: 03/02/22 11:14 AM    Specimen: Blood   Result Value Ref Range    HLA B27 Negative    Sedimentation Rate    Collection Time: 03/02/22 11:14 AM    Specimen: Blood   Result Value Ref Range    Sed Rate 13 0 - 15 mm/hr   Uric Acid    Collection Time: 03/02/22  11:14 AM    Specimen: Blood   Result Value Ref Range    Uric Acid 7.4 (H) 3.4 - 7.0 mg/dL   Rheumatoid Factor    Collection Time: 03/02/22 11:14 AM    Specimen: Blood   Result Value Ref Range    Rheumatoid Factor Quantitative <10.0 0.0 - 14.0 IU/mL   Nuclear Antigen Antibody, IFA    Collection Time: 03/02/22 11:14 AM    Specimen: Blood   Result Value Ref Range    JOSÉ LUIS Negative    Comprehensive Metabolic Panel    Collection Time: 03/02/22 11:14 AM    Specimen: Blood   Result Value Ref Range    Glucose 98 65 - 99 mg/dL    BUN 12 6 - 20 mg/dL    Creatinine 0.99 0.76 - 1.27 mg/dL    Sodium 141 136 - 145 mmol/L    Potassium 4.5 3.5 - 5.2 mmol/L    Chloride 104 98 - 107 mmol/L    CO2 22.6 22.0 - 29.0 mmol/L    Calcium 9.8 8.6 - 10.5 mg/dL    Total Protein 7.5 6.0 - 8.5 g/dL    Albumin 4.60 3.50 - 5.20 g/dL    ALT (SGPT) 36 1 - 41 U/L    AST (SGOT) 26 1 - 40 U/L    Alkaline Phosphatase 111 39 - 117 U/L    Total Bilirubin 0.7 0.0 - 1.2 mg/dL    Globulin 2.9 gm/dL    A/G Ratio 1.6 g/dL    BUN/Creatinine Ratio 12.1 7.0 - 25.0    Anion Gap 14.4 5.0 - 15.0 mmol/L    eGFR 102.5 >60.0 mL/min/1.73   TSH Rfx On Abnormal To Free T4    Collection Time: 03/02/22 11:14 AM    Specimen: Blood   Result Value Ref Range    TSH 1.050 0.270 - 4.200 uIU/mL   Lipid Panel    Collection Time: 03/02/22 11:14 AM    Specimen: Blood   Result Value Ref Range    Total Cholesterol 232 (H) 0 - 200 mg/dL    Triglycerides 127 0 - 150 mg/dL    HDL Cholesterol 51 40 - 60 mg/dL    LDL Cholesterol  158 (H) 0 - 100 mg/dL    VLDL Cholesterol 23 5 - 40 mg/dL    LDL/HDL Ratio 3.05    HCV Antibody Rfx To Qnt PCR    Collection Time: 03/02/22 11:14 AM    Specimen: Blood   Result Value Ref Range    Hepatitis C Ab <0.1 0.0 - 0.9 s/co ratio   CBC Auto Differential    Collection Time: 03/02/22 11:14 AM    Specimen: Blood   Result Value Ref Range    WBC 7.31 3.40 - 10.80 10*3/mm3    RBC 5.57 4.14 - 5.80 10*6/mm3    Hemoglobin 16.8 13.0 - 17.7 g/dL    Hematocrit 49.9 37.5 -  51.0 %    MCV 89.6 79.0 - 97.0 fL    MCH 30.2 26.6 - 33.0 pg    MCHC 33.7 31.5 - 35.7 g/dL    RDW 13.5 12.3 - 15.4 %    RDW-SD 44.2 37.0 - 54.0 fl    MPV 10.0 6.0 - 12.0 fL    Platelets 263 140 - 450 10*3/mm3    Neutrophil % 58.2 42.7 - 76.0 %    Lymphocyte % 29.4 19.6 - 45.3 %    Monocyte % 9.2 5.0 - 12.0 %    Eosinophil % 2.5 0.3 - 6.2 %    Basophil % 0.4 0.0 - 1.5 %    Immature Grans % 0.3 0.0 - 0.5 %    Neutrophils, Absolute 4.26 1.70 - 7.00 10*3/mm3    Lymphocytes, Absolute 2.15 0.70 - 3.10 10*3/mm3    Monocytes, Absolute 0.67 0.10 - 0.90 10*3/mm3    Eosinophils, Absolute 0.18 0.00 - 0.40 10*3/mm3    Basophils, Absolute 0.03 0.00 - 0.20 10*3/mm3    Immature Grans, Absolute 0.02 0.00 - 0.05 10*3/mm3    nRBC 0.0 0.0 - 0.2 /100 WBC   Interpretation:    Collection Time: 03/02/22 11:14 AM    Specimen: Blood   Result Value Ref Range    Interpretation Comment

## 2022-06-26 DIAGNOSIS — I10 PRIMARY HYPERTENSION: ICD-10-CM

## 2022-06-27 RX ORDER — LOSARTAN POTASSIUM 50 MG/1
TABLET ORAL
Qty: 30 TABLET | Refills: 0 | Status: SHIPPED | OUTPATIENT
Start: 2022-06-27 | End: 2022-07-25

## 2022-06-27 NOTE — TELEPHONE ENCOUNTER
Rx Refill Note  Requested Prescriptions     Pending Prescriptions Disp Refills   • losartan (COZAAR) 50 MG tablet [Pharmacy Med Name: Losartan Potassium Oral Tablet 50 MG] 30 tablet 0     Sig: TAKE ONE TABLET BY MOUTH ONE TIME DAILY      Last filled: 03/30/2022  Last office visit with prescribing clinician: 3/30/2022      Next office visit with prescribing clinician: Visit date not found     April LESLY Montero MA  06/27/22, 16:01 EDT

## 2022-07-25 DIAGNOSIS — I10 PRIMARY HYPERTENSION: ICD-10-CM

## 2022-07-25 RX ORDER — LOSARTAN POTASSIUM 50 MG/1
TABLET ORAL
Qty: 30 TABLET | Refills: 0 | Status: SHIPPED | OUTPATIENT
Start: 2022-07-25 | End: 2022-08-12 | Stop reason: DRUGHIGH

## 2022-08-12 ENCOUNTER — LAB (OUTPATIENT)
Dept: LAB | Facility: HOSPITAL | Age: 37
End: 2022-08-12

## 2022-08-12 ENCOUNTER — PATIENT MESSAGE (OUTPATIENT)
Dept: INTERNAL MEDICINE | Facility: CLINIC | Age: 37
End: 2022-08-12

## 2022-08-12 ENCOUNTER — OFFICE VISIT (OUTPATIENT)
Dept: INTERNAL MEDICINE | Facility: CLINIC | Age: 37
End: 2022-08-12

## 2022-08-12 VITALS
WEIGHT: 315 LBS | OXYGEN SATURATION: 97 % | BODY MASS INDEX: 40.43 KG/M2 | HEART RATE: 61 BPM | TEMPERATURE: 98.8 F | SYSTOLIC BLOOD PRESSURE: 140 MMHG | DIASTOLIC BLOOD PRESSURE: 82 MMHG | HEIGHT: 74 IN

## 2022-08-12 DIAGNOSIS — E78.00 PURE HYPERCHOLESTEROLEMIA: ICD-10-CM

## 2022-08-12 DIAGNOSIS — E66.01 CLASS 3 SEVERE OBESITY WITH SERIOUS COMORBIDITY AND BODY MASS INDEX (BMI) OF 40.0 TO 44.9 IN ADULT, UNSPECIFIED OBESITY TYPE: ICD-10-CM

## 2022-08-12 DIAGNOSIS — I10 PRIMARY HYPERTENSION: Primary | ICD-10-CM

## 2022-08-12 DIAGNOSIS — I10 PRIMARY HYPERTENSION: ICD-10-CM

## 2022-08-12 DIAGNOSIS — K21.9 GASTROESOPHAGEAL REFLUX DISEASE, UNSPECIFIED WHETHER ESOPHAGITIS PRESENT: ICD-10-CM

## 2022-08-12 LAB
ALBUMIN SERPL-MCNC: 4.4 G/DL (ref 3.5–5.2)
ALBUMIN/GLOB SERPL: 2 G/DL
ALP SERPL-CCNC: 105 U/L (ref 39–117)
ALT SERPL W P-5'-P-CCNC: 60 U/L (ref 1–41)
ANION GAP SERPL CALCULATED.3IONS-SCNC: 11.1 MMOL/L (ref 5–15)
AST SERPL-CCNC: 25 U/L (ref 1–40)
BILIRUB SERPL-MCNC: 0.5 MG/DL (ref 0–1.2)
BUN SERPL-MCNC: 15 MG/DL (ref 6–20)
BUN/CREAT SERPL: 17.9 (ref 7–25)
CALCIUM SPEC-SCNC: 9.2 MG/DL (ref 8.6–10.5)
CHLORIDE SERPL-SCNC: 104 MMOL/L (ref 98–107)
CHOLEST SERPL-MCNC: 240 MG/DL (ref 0–200)
CO2 SERPL-SCNC: 22.9 MMOL/L (ref 22–29)
CREAT SERPL-MCNC: 0.84 MG/DL (ref 0.76–1.27)
EGFRCR SERPLBLD CKD-EPI 2021: 117.4 ML/MIN/1.73
GLOBULIN UR ELPH-MCNC: 2.2 GM/DL
GLUCOSE SERPL-MCNC: 88 MG/DL (ref 65–99)
HDLC SERPL-MCNC: 50 MG/DL (ref 40–60)
LDLC SERPL CALC-MCNC: 169 MG/DL (ref 0–100)
LDLC/HDLC SERPL: 3.32 {RATIO}
POTASSIUM SERPL-SCNC: 4.5 MMOL/L (ref 3.5–5.2)
PROT SERPL-MCNC: 6.6 G/DL (ref 6–8.5)
SODIUM SERPL-SCNC: 138 MMOL/L (ref 136–145)
TRIGL SERPL-MCNC: 119 MG/DL (ref 0–150)
VLDLC SERPL-MCNC: 21 MG/DL (ref 5–40)

## 2022-08-12 PROCEDURE — 80053 COMPREHEN METABOLIC PANEL: CPT | Performed by: FAMILY MEDICINE

## 2022-08-12 PROCEDURE — 99214 OFFICE O/P EST MOD 30 MIN: CPT | Performed by: FAMILY MEDICINE

## 2022-08-12 PROCEDURE — 80061 LIPID PANEL: CPT | Performed by: FAMILY MEDICINE

## 2022-08-12 RX ORDER — LOSARTAN POTASSIUM 100 MG/1
100 TABLET ORAL DAILY
Qty: 90 TABLET | Refills: 0 | Status: SHIPPED | OUTPATIENT
Start: 2022-08-12 | End: 2022-11-07

## 2022-08-12 RX ORDER — OMEPRAZOLE 20 MG/1
20 CAPSULE, DELAYED RELEASE ORAL DAILY
Qty: 90 CAPSULE | Refills: 1 | Status: SHIPPED | OUTPATIENT
Start: 2022-08-12 | End: 2023-01-18

## 2022-08-12 RX ORDER — MAGNESIUM 30 MG
30 TABLET ORAL
COMMUNITY

## 2022-08-12 RX ORDER — LOSARTAN POTASSIUM 50 MG/1
50 TABLET ORAL DAILY
Qty: 90 TABLET | Refills: 0 | Status: CANCELLED | OUTPATIENT
Start: 2022-08-12

## 2022-08-12 RX ORDER — VIT C/B6/B5/MAGNESIUM/HERB 173 50-5-6-5MG
CAPSULE ORAL
COMMUNITY

## 2022-08-12 RX ORDER — DICLOFENAC SODIUM 75 MG/1
75 TABLET, DELAYED RELEASE ORAL
COMMUNITY
Start: 2022-07-15 | End: 2022-08-14

## 2022-08-12 RX ORDER — METHOCARBAMOL 750 MG/1
750 TABLET, FILM COATED ORAL
COMMUNITY
Start: 2022-07-15

## 2022-08-12 NOTE — PROGRESS NOTES
"Subjective   Serge Narayanan is a 36 y.o. male.     Chief Complaint   Patient presents with   • Hypertension   • Obesity     Discuss trying to lose weight       Visit Vitals  /82   Pulse 61   Temp 98.8 °F (37.1 °C)   Ht 188 cm (74\")   Wt (!) 143 kg (315 lb)   SpO2 97%   BMI 40.44 kg/m²         Hypertension  This is a chronic problem. The current episode started more than 1 month ago. The problem has been gradually improving since onset. The problem is controlled. Pertinent negatives include no anxiety, blurred vision, chest pain, headaches, malaise/fatigue, neck pain, orthopnea, palpitations, peripheral edema, PND, shortness of breath or sweats. There are no associated agents to hypertension. Risk factors for coronary artery disease include obesity. There are no compliance problems.  There is no history of angina, kidney disease, CAD/MI, CVA, heart failure, left ventricular hypertrophy, PVD or retinopathy. There is no history of chronic renal disease, sleep apnea or a thyroid problem.   Obesity  This is a chronic problem. The current episode started more than 1 year ago. The problem occurs constantly. The problem has been unchanged. Associated symptoms include fatigue. Pertinent negatives include no abdominal pain, anorexia, arthralgias, change in bowel habit, chest pain, chills, congestion, coughing, diaphoresis, fever, headaches, joint swelling, myalgias, nausea, neck pain, numbness, rash, sore throat, swollen glands, urinary symptoms, vertigo, visual change, vomiting or weakness. Nothing aggravates the symptoms. Treatments tried: diet change. The treatment provided no relief.   Hyperlipidemia  This is a chronic problem. The current episode started more than 1 month ago. Recent lipid tests were reviewed and are high. Exacerbating diseases include obesity. He has no history of chronic renal disease, diabetes, hypothyroidism, liver disease or nephrotic syndrome. There are no known factors aggravating his " hyperlipidemia. Pertinent negatives include no chest pain, focal sensory loss, focal weakness, leg pain, myalgias or shortness of breath. Current antihyperlipidemic treatment includes diet change (fiber). Risk factors for coronary artery disease include dyslipidemia, hypertension, male sex, obesity and family history.      Pt had lumbar spine injury last yr. Pt had his injection yesterday in his back.   Pt was off work for 9 months and went back to work about 3 weeks ago has is fatigued.   Pt is taking fiber for his hyperlipidemia and apple cider vinegar.     The following portions of the patient's history were reviewed and updated as appropriate: allergies, current medications, past family history, past medical history, past social history, past surgical history and problem list.    Past Medical History:   Diagnosis Date   • Arthritis    • Asthma    • Kidney stone    • Low back pain    • Lumbar sprain    • Personal history of COVID-19 01/2021   • Pure hypercholesterolemia 3/30/2022      Past Surgical History:   Procedure Laterality Date   • TONSILLECTOMY  2011      Family History   Problem Relation Age of Onset   • Hypertension Father    • Heart attack Father         related to Covid   • Arthritis Father    • Migraine headaches Mother    • Arthritis Mother    • Asthma Mother       Social History     Socioeconomic History   • Marital status:    Tobacco Use   • Smoking status: Never Smoker   • Smokeless tobacco: Never Used   Vaping Use   • Vaping Use: Never used   Substance and Sexual Activity   • Alcohol use: Yes     Comment: OCC   • Drug use: Never   • Sexual activity: Yes     Partners: Male     Birth control/protection: None      Allergies   Allergen Reactions   • Hydrocodone Other (See Comments) and GI Intolerance     vomiting   • Oxycodone Nausea And Vomiting       Review of Systems   Constitutional: Positive for fatigue. Negative for chills, diaphoresis, fever and malaise/fatigue.   HENT: Negative for  congestion and sore throat.    Eyes: Negative for blurred vision.   Respiratory: Negative for cough and shortness of breath.    Cardiovascular: Negative for chest pain, palpitations, orthopnea and PND.   Gastrointestinal: Negative for abdominal pain, anorexia, change in bowel habit, nausea and vomiting.   Musculoskeletal: Negative for arthralgias, joint swelling, myalgias and neck pain.   Skin: Negative for rash.   Neurological: Negative for vertigo, focal weakness, weakness, numbness and headaches.       Objective   Physical Exam  Vitals and nursing note reviewed.   Constitutional:       Appearance: He is well-developed.   HENT:      Head: Normocephalic.      Right Ear: External ear normal.      Left Ear: External ear normal.      Nose: Nose normal.   Eyes:      General: Lids are normal.      Conjunctiva/sclera: Conjunctivae normal.      Pupils: Pupils are equal, round, and reactive to light.   Neck:      Thyroid: No thyroid mass or thyromegaly.      Vascular: No carotid bruit.      Trachea: Trachea normal.   Cardiovascular:      Rate and Rhythm: Normal rate and regular rhythm.      Heart sounds: No murmur heard.  Pulmonary:      Effort: Pulmonary effort is normal. No respiratory distress.      Breath sounds: Normal breath sounds. No decreased breath sounds, wheezing, rhonchi or rales.   Chest:      Chest wall: No tenderness.   Abdominal:      General: Bowel sounds are normal.      Palpations: Abdomen is soft.      Tenderness: There is no abdominal tenderness.   Musculoskeletal:         General: Normal range of motion.      Cervical back: Normal range of motion and neck supple.   Skin:     General: Skin is warm and dry.   Neurological:      Mental Status: He is alert and oriented to person, place, and time.   Psychiatric:         Behavior: Behavior normal.         Assessment & Plan   Diagnoses and all orders for this visit:    1. Primary hypertension (Primary)  -     losartan (Cozaar) 100 MG tablet; Take 1 tablet  by mouth Daily.  Dispense: 90 tablet; Refill: 0  -     Comprehensive Metabolic Panel; Future  -     Lipid Panel; Future    2. Pure hypercholesterolemia  -     Comprehensive Metabolic Panel; Future  -     Lipid Panel; Future    3. Class 3 severe obesity with serious comorbidity and body mass index (BMI) of 40.0 to 44.9 in adult, unspecified obesity type (HCC)  -     Ambulatory Referral to Nutrition Services    4. Gastroesophageal reflux disease, unspecified whether esophagitis present  -     omeprazole (priLOSEC) 20 MG capsule; Take 1 capsule by mouth Daily.  Dispense: 90 capsule; Refill: 1      Losartan dose increased.              Current Outpatient Medications:   •  diclofenac (VOLTAREN) 75 MG EC tablet, Take 75 mg by mouth., Disp: , Rfl:   •  magnesium 30 MG tablet, Take 30 mg by mouth., Disp: , Rfl:   •  methocarbamol (ROBAXIN) 750 MG tablet, Take 750 mg by mouth., Disp: , Rfl:   •  Turmeric 500 MG capsule, Take  by mouth., Disp: , Rfl:   •  losartan (Cozaar) 100 MG tablet, Take 1 tablet by mouth Daily., Disp: 90 tablet, Rfl: 0  •  omeprazole (priLOSEC) 20 MG capsule, Take 1 capsule by mouth Daily., Disp: 90 capsule, Rfl: 1    Return in about 4 weeks (around 9/9/2022) for Recheck bp.     Pt called back requesting omeprazole for reflux.

## 2022-08-12 NOTE — TELEPHONE ENCOUNTER
From: Serge Narayanan  To: Shilpi Morales MD  Sent: 8/12/2022 3:02 PM EDT  Subject: Question I meant to ask today     I don’t think I mention this to you last visit either like I meant to but I take esomeprazole magnesium for heart burn. I have for years it just didn’t cross my mind apparently. I was curious if I would be able to get a prescription for it. They discount it if it is prescribed to you and it’s the only thing that helps my heartburn/indigestion. I just take the 20 mg capsule once daily     Thank you!    Kodi

## 2022-08-14 ENCOUNTER — PATIENT MESSAGE (OUTPATIENT)
Dept: INTERNAL MEDICINE | Facility: CLINIC | Age: 37
End: 2022-08-14

## 2022-08-15 NOTE — TELEPHONE ENCOUNTER
From: Shilpi Morales MD  To: Serge Narayanan  Sent: 8/14/2022 4:14 PM EDT  Subject: elevated liver enzyme    ALT has increased significantly. Have you had any new body fluid exposures?  Please avoid tylenol and alcohol which can damage the liver. Will place orders for liver ultrasound and acute hepatitis panel.(Blood work). Fatty liver is a common cause for elevated liver enzymes.  LDL bad cholesterol has increased.    Please follow a low animal fat diet that is also low in sugar, low in junk food, low in sweet drinks and low in alcohol. Please increase the amount of fiber in your diet as well as increasing your daily exercise, such as walking.    We need to consider adding statins

## 2022-08-17 ENCOUNTER — TELEPHONE (OUTPATIENT)
Dept: INTERNAL MEDICINE | Facility: CLINIC | Age: 37
End: 2022-08-17

## 2022-08-17 NOTE — TELEPHONE ENCOUNTER
----- Message from Shilpi GARCIA MD sent at 8/14/2022  4:14 PM EDT -----  Pt's ALT has increased significantly. Had he had any new body fluid exposures?  Please avoid tylenol and alcohol which can damage the liver. Will place orders for liver ultrasound and acute hepatitis panel.    LDL bad cholesterol has increased. Please follow low animal fat, low sugar, and low alcohol diet. Need to consider statins

## 2022-09-22 ENCOUNTER — APPOINTMENT (OUTPATIENT)
Dept: NUTRITION | Facility: HOSPITAL | Age: 37
End: 2022-09-22

## 2022-11-05 DIAGNOSIS — I10 PRIMARY HYPERTENSION: ICD-10-CM

## 2022-11-07 RX ORDER — LOSARTAN POTASSIUM 100 MG/1
TABLET ORAL
Qty: 90 TABLET | Refills: 0 | Status: SHIPPED | OUTPATIENT
Start: 2022-11-07 | End: 2023-01-31

## 2022-11-08 ENCOUNTER — TELEPHONE (OUTPATIENT)
Dept: INTERNAL MEDICINE | Facility: CLINIC | Age: 37
End: 2022-11-08

## 2022-11-08 ENCOUNTER — PATIENT MESSAGE (OUTPATIENT)
Dept: INTERNAL MEDICINE | Facility: CLINIC | Age: 37
End: 2022-11-08

## 2022-11-08 NOTE — TELEPHONE ENCOUNTER
PATIENT HAS CALLED AND STATED HE WILL BE DROPPING OFF A FORM TOMORROW MORNING THAT NEEDS TO BE FILLED OUT AS SOON AS POSSIBLE. PATIENT IS NEEDING FORM FILLED OUT FOR EMPLOYMENT FOR NEW JOB.  PATIENT IS REQUESTING A CALL BACK ONCE FORM IS READY FOR .    CALL BACK NUMBER [phone]

## 2022-11-08 NOTE — TELEPHONE ENCOUNTER
Spoke with pt and advised that it may be an office visit before we can complete paper work, depending on exactly the info they need. He was seen in August but nothing for a release saying he is capable of his said job duties.     He is faxing the form over so we can see exactly what he needs and ask Dr. Morales if she can fill out without seeing him.

## 2022-11-10 ENCOUNTER — HOSPITAL ENCOUNTER (OUTPATIENT)
Dept: NUTRITION | Facility: HOSPITAL | Age: 37
Setting detail: RECURRING SERIES
Discharge: HOME | End: 2022-11-10

## 2022-11-10 PROCEDURE — 97802 MEDICAL NUTRITION INDIV IN: CPT

## 2022-11-11 NOTE — ADDENDUM NOTE
Encounter addended by: Mecca Vidal RD on: 11/11/2022 9:56 AM   Actions taken: Clinical Note Signed, Charge Capture section accepted

## 2022-11-11 NOTE — CONSULTS
"Adult Outpatient Nutrition  Assessment/PES    Patient Name: Serge Narayanan  YOB: 1985  MRN: [id number]      Assessment Date: 11/10/22      This medical referred consult was provided via in person consultation. Consent for treatment was given verbally. RD spent a total of 60 minutes with patient today.      Reason for visit:     Kodi is a 36 yo male who is referred today for weight loss/healthy eating.     Session Details:     Attendees: Kodi  Language/communication details: Kodi is [place] but had no difficulties with communication.  Barriers to learning: None  Health Literacy Assessment: Not completed   Details of home: Lives at home with his  Juve, and their 3 dogs (Bairon, Qing and Stephanie).    Anthropometrics:     Ht Readings from Last 1 Encounters:   08/12/22 188 cm (74\")      Wt Readings from Last 1 Encounters:   08/12/22 (!) 143 kg (315 lb)      BMI Readings from Last 1 Encounters:   08/12/22 40.44 kg/m²      Patient weight not recorded              Recent weight change: Stable    Pertinent Labs:     Reviewed    Pertinent Medications/Supplements:    Reviewed     Nutrition Assessment:     Who prepares most meals: Juve and Kodi share this responsibility  Who does grocery shopping: Juve and Kodi share this responsibility   Food insecurity: Denies  Food allergies: Denies   Intolerances/aversions: Denies  Difficulty chewing: Denies  Difficulty swallowing: Denies  Gastrointestinal symptoms that impact intake or food choices: Denies  Diet requirements related to medical condition, personal preference or cultural belief: Denies  Previous nutrition education/information: None discussed   Barriers to following/maintaining a diet/healthy eating plan: Portions   Support plan: SLICK An    Diet recall:     24 hr diet recall available under Miscellaneous Reports > Questionnaire: Nutrition Assessment    Nutrition assessment comments: Kodi eats 2-3 meals daily (sometimes skipping breakfast). His " "meals do tend to be a bit lighter in the earlier part of the day. Kodi notes that while he doesn't eat \"horribly\", he does feel that his portions tend to be bigger than they should be and he is always hungry.     Physical activity:      Physical activity comments: Kodi has been recovering from spinal procedure for over a year, however he does plan to begin adding physical activity into his weekly routine.     PES Statement:     Food and nutrition related knowledge deficit related to no prior education with RD as evidenced by referral to RD for education on healthy eating.    Discussion/Education:    We discussed the importance of eating enough and to establish a meal pattern so that meals/snacks are fairly similar in size (spacing calories out throughout the day). And we discussed the rationale and benefit for this strategy. We also discussed the importance of ensuring that he isn't leaving big gaps of time (>6 hrs) without eating. Given that Kodi is always hungry, I do suspect that he is probably just not eating enough in general but we also discussed the possibility that the nutrient content could be impacting his satiety. We did focus somewhat on the early in the day meals/snacks and how these could be adjusted to increase nutrient/calorie value to hopefully be more satiating. We discussed the importance of incorporating all macronutrients for a balanced meal/snack but again, also to be more satiating.     Intervention Goal(s):     1. Increase nutrient content of breakfast on a routine basis using the \"Start Simply with My Plate\" handout      Resources Provided:     1. [facility] Weight Loss Toolkit  2. Start Simply with My Chart  3. [facility] The 3 Macronutrients        Total of 60 minutes spent with patient on nutrition counseling. Education based on Academy of Nutrition and Dietetics guidelines. Patient was provided with RD's contact information. Follow up visit is scheduled for 12/20/22. Thank you for this " referral.

## 2022-11-16 ENCOUNTER — OFFICE VISIT (OUTPATIENT)
Dept: INTERNAL MEDICINE | Facility: CLINIC | Age: 37
End: 2022-11-16

## 2022-11-16 ENCOUNTER — LAB (OUTPATIENT)
Dept: LAB | Facility: HOSPITAL | Age: 37
End: 2022-11-16

## 2022-11-16 VITALS
OXYGEN SATURATION: 97 % | HEART RATE: 60 BPM | HEIGHT: 74 IN | TEMPERATURE: 98.4 F | SYSTOLIC BLOOD PRESSURE: 142 MMHG | DIASTOLIC BLOOD PRESSURE: 84 MMHG | WEIGHT: 308 LBS | BODY MASS INDEX: 39.53 KG/M2

## 2022-11-16 DIAGNOSIS — Z11.1 SCREENING-PULMONARY TB: ICD-10-CM

## 2022-11-16 DIAGNOSIS — Z02.0 ENCOUNTER FOR EXAMINATION FOR ADMISSION TO EDUCATIONAL INSTITUTION: Primary | ICD-10-CM

## 2022-11-16 DIAGNOSIS — H61.21 EXCESSIVE CERUMEN IN EAR CANAL, RIGHT: ICD-10-CM

## 2022-11-16 PROCEDURE — 99212 OFFICE O/P EST SF 10 MIN: CPT | Performed by: FAMILY MEDICINE

## 2022-11-16 PROCEDURE — 86480 TB TEST CELL IMMUN MEASURE: CPT

## 2022-11-16 PROCEDURE — 69209 REMOVE IMPACTED EAR WAX UNI: CPT | Performed by: FAMILY MEDICINE

## 2022-11-16 RX ORDER — DICLOFENAC SODIUM 75 MG/1
TABLET, DELAYED RELEASE ORAL
COMMUNITY
Start: 2022-09-12

## 2022-11-16 NOTE — PROGRESS NOTES
"Subjective   Serge Narayanan is a 37 y.o. male.     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  Pt needs clearance regarding infectious diseases.     Pt is seeing Nutritionist and losing weight.  Pt has wax in his right ear.  Patient needs the wax removed from his right ear so that a fellow student can demonstrate on him hearing aid fitting.    The following portions of the patient's history were reviewed and updated as appropriate: allergies, current medications, past family history, past medical history, past social history, past surgical history and problem list.    Past Medical History:   Diagnosis Date   • Arthritis    • Asthma    • Kidney stone    • Low back pain    • Lumbar sprain    • Personal history of COVID-19 01/2021   • Pure hypercholesterolemia 3/30/2022      Past Surgical History:   Procedure Laterality Date   • TONSILLECTOMY  2011      Family History   Problem Relation Age of Onset   • Hypertension Father    • Heart attack Father         related to Covid   • Arthritis Father    • Migraine headaches Mother    • Arthritis Mother    • Asthma Mother       Social History     Socioeconomic History   • Marital status:    Tobacco Use   • Smoking status: Never   • Smokeless tobacco: Never   Vaping Use   • Vaping Use: Never used   Substance and Sexual Activity   • Alcohol use: Yes     Comment: OCC   • Drug use: Never   • Sexual activity: Yes     Partners: Male     Birth control/protection: None      Allergies   Allergen Reactions   • Hydrocodone Other (See Comments) " and GI Intolerance     vomiting   • Oxycodone Nausea And Vomiting       Review of Systems   Constitutional: Negative for activity change, appetite change, chills, diaphoresis, fatigue, fever and unexpected weight change.   HENT: Negative for congestion, dental problem, drooling, ear discharge, ear pain, facial swelling, hearing loss, mouth sores, nosebleeds, postnasal drip, rhinorrhea, sinus pressure, sinus pain, sneezing, sore throat, tinnitus, trouble swallowing and voice change.    Eyes: Negative for photophobia, pain, discharge, redness, itching and visual disturbance.   Respiratory: Negative for apnea, cough, choking, chest tightness, shortness of breath, wheezing and stridor.    Cardiovascular: Negative for chest pain, palpitations and leg swelling.   Gastrointestinal: Negative for abdominal distention, abdominal pain, anal bleeding, blood in stool, constipation, diarrhea, nausea, rectal pain and vomiting.   Endocrine: Negative for cold intolerance, heat intolerance, polydipsia, polyphagia and polyuria.   Genitourinary: Negative for decreased urine volume, difficulty urinating, dysuria, enuresis, flank pain, frequency, genital sores, hematuria, penile discharge, penile pain, penile swelling, scrotal swelling, testicular pain and urgency.   Musculoskeletal: Negative for arthralgias, back pain, gait problem, joint swelling, myalgias, neck pain and neck stiffness.   Skin: Negative for color change, pallor, rash and wound.   Allergic/Immunologic: Negative for environmental allergies, food allergies and immunocompromised state.   Neurological: Negative for dizziness, tremors, seizures, syncope, facial asymmetry, speech difficulty, weakness, light-headedness, numbness and headaches.   Hematological: Negative for adenopathy. Does not bruise/bleed easily.   Psychiatric/Behavioral: Negative for agitation, behavioral problems, confusion, decreased concentration, dysphoric mood, hallucinations, self-injury, sleep  disturbance and suicidal ideas. The patient is not nervous/anxious and is not hyperactive.        Objective     Physical Exam  Vitals and nursing note reviewed.   Constitutional:       Appearance: He is well-developed.   HENT:      Head: Normocephalic.      Right Ear: Tympanic membrane, ear canal and external ear normal.      Left Ear: Tympanic membrane and external ear normal.      Ears:        Comments: Cerumen in inferior canal that will interfere with fitting hearing aid     Nose: Nose normal.      Mouth/Throat:      Lips: Pink.      Mouth: Mucous membranes are moist. No injury.      Dentition: Normal dentition.      Tongue: No lesions. Tongue does not deviate from midline.      Palate: No mass and lesions.      Pharynx: No pharyngeal swelling, oropharyngeal exudate or posterior oropharyngeal erythema. Uvula swelling: uvula and tonsils absent.   Eyes:      General: Lids are normal.      Conjunctiva/sclera: Conjunctivae normal.      Pupils: Pupils are equal, round, and reactive to light.   Neck:      Thyroid: No thyroid mass or thyromegaly.      Vascular: No carotid bruit.      Trachea: Trachea normal.   Cardiovascular:      Rate and Rhythm: Normal rate and regular rhythm.      Heart sounds: No murmur heard.  Pulmonary:      Effort: Pulmonary effort is normal. No respiratory distress.      Breath sounds: Normal breath sounds. No decreased breath sounds, wheezing, rhonchi or rales.   Chest:      Chest wall: No tenderness.   Abdominal:      General: Bowel sounds are normal.      Palpations: Abdomen is soft.      Tenderness: There is no abdominal tenderness.   Musculoskeletal:         General: Normal range of motion.      Cervical back: Normal range of motion and neck supple.   Skin:     General: Skin is warm and dry.   Neurological:      Mental Status: He is alert and oriented to person, place, and time.   Psychiatric:         Behavior: Behavior normal.         Assessment & Plan   Diagnoses and all orders for this  visit:    1. Encounter for examination for admission to [facility] (Primary)    2. Screening-pulmonary TB  -     QuantiFERON TB Plus Client Incubated; Future    3. 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         Current Outpatient Medications:   •  diclofenac (VOLTAREN) 75 MG EC tablet, , Disp: , Rfl:   •  losartan (COZAAR) 100 MG tablet, TAKE ONE TABLET BY MOUTH ONE TIME DAILY, Disp: 90 tablet, Rfl: 0  •  magnesium 30 MG tablet, Take 30 mg by mouth., Disp: , Rfl:   •  methocarbamol (ROBAXIN) 750 MG tablet, Take 750 mg by mouth., Disp: , Rfl:   •  omeprazole (priLOSEC) 20 MG capsule, Take 1 capsule by mouth Daily., Disp: 90 capsule, Rfl: 1  •  Turmeric 500 MG capsule, Take  by mouth., Disp: , Rfl:     Return if symptoms worsen or fail to improve, for Recheck.

## 2022-11-18 LAB
GAMMA INTERFERON BACKGROUND BLD IA-ACNC: 0.25 IU/ML
M TB IFN-G BLD-IMP: NEGATIVE
M TB IFN-G CD4+ BCKGRND COR BLD-ACNC: 0.22 IU/ML
M TB IFN-G CD4+CD8+ BCKGRND COR BLD-ACNC: 0.21 IU/ML
MITOGEN IGNF BLD-ACNC: >10 IU/ML
SERVICE CMNT-IMP: NORMAL

## 2022-11-20 ENCOUNTER — TELEPHONE (OUTPATIENT)
Dept: INTERNAL MEDICINE | Facility: CLINIC | Age: 37
End: 2022-11-20

## 2022-11-21 NOTE — TELEPHONE ENCOUNTER
PN that paperwork is completed and signed.   He will pick it up in office.  I also printed out the TB lab results.

## 2022-11-21 NOTE — TELEPHONE ENCOUNTER
Concetta Perdomo MA 11/21/2022 8:25 AM EST      ----- Message -----  From: Serge Narayanan  Sent: 11/21/2022 12:28 AM EST  To: [facility] [address] [facility]  Subject: Document Sent in     I was just curious if now that the test results are back of I can swing buy this morning and  the paperwork that needed signed? I need to get it in the mail asap. Dr Morales kept my original copy at my appt on weds last week. I work at 10:30 but I can swing by prior if possible     Thank you!    Kodi

## 2023-01-18 DIAGNOSIS — K21.9 GASTROESOPHAGEAL REFLUX DISEASE, UNSPECIFIED WHETHER ESOPHAGITIS PRESENT: ICD-10-CM

## 2023-01-18 RX ORDER — OMEPRAZOLE 20 MG/1
TABLET, DELAYED RELEASE ORAL
Qty: 30 EACH | Refills: 0 | Status: SHIPPED | OUTPATIENT
Start: 2023-01-18 | End: 2023-02-27

## 2023-01-31 DIAGNOSIS — I10 PRIMARY HYPERTENSION: ICD-10-CM

## 2023-01-31 RX ORDER — LOSARTAN POTASSIUM 100 MG/1
TABLET ORAL
Qty: 90 TABLET | Refills: 0 | Status: SHIPPED | OUTPATIENT
Start: 2023-01-31

## 2023-02-20 ENCOUNTER — PATIENT MESSAGE (OUTPATIENT)
Dept: INTERNAL MEDICINE | Facility: CLINIC | Age: 38
End: 2023-02-20
Payer: COMMERCIAL

## 2023-02-20 DIAGNOSIS — K21.9 GASTROESOPHAGEAL REFLUX DISEASE, UNSPECIFIED WHETHER ESOPHAGITIS PRESENT: Primary | ICD-10-CM

## 2023-02-20 RX ORDER — FEXOFENADINE HCL 180 MG/1
180 TABLET ORAL DAILY
Qty: 90 TABLET | Refills: 1 | Status: SHIPPED | OUTPATIENT
Start: 2023-02-20

## 2023-02-20 NOTE — TELEPHONE ENCOUNTER
From: Serge Narayanan  To: Shilpi Morales  Sent: 2/20/2023 3:59 PM EST  Subject: Allegra 180mg     I found out from my pharmacy if I have a prescription for my OTC Allegra with my insurance it would be completely free. I take it for allergies and have since I was a child. Never even thought to report that when I come in since it’s OTC. Is that something I would need to come see you for a prescription for it or something you can just send in? I don’t know how that works lol!       Thank you for all your help!    Kodi Narayanan

## 2023-04-04 ENCOUNTER — PATIENT MESSAGE (OUTPATIENT)
Dept: INTERNAL MEDICINE | Facility: CLINIC | Age: 38
End: 2023-04-04
Payer: COMMERCIAL

## 2023-04-05 RX ORDER — MONTELUKAST SODIUM 10 MG/1
10 TABLET ORAL NIGHTLY
Qty: 30 TABLET | Refills: 3 | Status: SHIPPED | OUTPATIENT
Start: 2023-04-05

## 2023-04-05 NOTE — TELEPHONE ENCOUNTER
From: Serge Narayanan  To: Shilpi Grahamon  Sent: 4/4/2023 1:00 AM EDT  Subject: Allergies going haywire    Good morning     Within the last week my allergies have let me have it. From congestion to coughing up a lung and having green and yellow mucus coming out of my nose and throat. I have been taking the meds 2x Sudafed every 4 hours a 12 hour mucinex twice a day and then starting to take breathing treatments 2-3 times a day from I’m guessing my asthma being retriggered. I have no sore throat or fever and no aches or pains and no feeling drained or tired just a ton of mucus and the cough. I Started taking the breathing treatments when I started having coughing fits and then wheezing afterwards and the treatments help drastically. Everything seems to get work in the evening and at night. A Telemedicine doc told me to add 2 25mg Benadryl at night to help with post nasal drip. However I can’t get over this cough and my voice sounding like a toad. Is there anything I can do? Or do you recommend? I can’t get an appt with you til June 1st     Thank you     Kodi

## 2023-04-19 ENCOUNTER — OFFICE VISIT (OUTPATIENT)
Dept: INTERNAL MEDICINE | Facility: CLINIC | Age: 38
End: 2023-04-19
Payer: COMMERCIAL

## 2023-04-19 VITALS
WEIGHT: 308 LBS | SYSTOLIC BLOOD PRESSURE: 144 MMHG | HEART RATE: 82 BPM | HEIGHT: 74 IN | BODY MASS INDEX: 39.53 KG/M2 | TEMPERATURE: 99.8 F | OXYGEN SATURATION: 98 % | DIASTOLIC BLOOD PRESSURE: 90 MMHG

## 2023-04-19 DIAGNOSIS — R06.2 WHEEZING: ICD-10-CM

## 2023-04-19 DIAGNOSIS — R05.1 ACUTE COUGH: Primary | ICD-10-CM

## 2023-04-19 DIAGNOSIS — R03.0 TRANSIENT ELEVATED BLOOD PRESSURE: ICD-10-CM

## 2023-04-19 DIAGNOSIS — U07.1 COVID-19 VIRUS INFECTION: ICD-10-CM

## 2023-04-19 LAB
EXPIRATION DATE: ABNORMAL
FLUAV AG UPPER RESP QL IA.RAPID: NOT DETECTED
FLUBV AG UPPER RESP QL IA.RAPID: NOT DETECTED
INTERNAL CONTROL: ABNORMAL
Lab: ABNORMAL
SARS-COV-2 AG UPPER RESP QL IA.RAPID: DETECTED

## 2023-04-19 RX ORDER — ALBUTEROL SULFATE 90 UG/1
2 AEROSOL, METERED RESPIRATORY (INHALATION) EVERY 4 HOURS PRN
Qty: 18 G | Refills: 1 | Status: SHIPPED | OUTPATIENT
Start: 2023-04-19

## 2023-04-19 RX ORDER — BACLOFEN 10 MG/1
10 TABLET ORAL DAILY
COMMUNITY
Start: 2023-04-04

## 2023-04-19 RX ORDER — ALBUTEROL SULFATE 2.5 MG/3ML
2.5 SOLUTION RESPIRATORY (INHALATION) EVERY 4 HOURS PRN
Qty: 25 EACH | Refills: 3 | Status: SHIPPED | OUTPATIENT
Start: 2023-04-19

## 2023-04-19 RX ORDER — FLUTICASONE PROPIONATE 50 UG/1
SPRAY, METERED NASAL
COMMUNITY
Start: 2023-02-06

## 2023-04-19 NOTE — PROGRESS NOTES
"Subjective   eSrge Narayanan is a 37 y.o. male.     Chief Complaint   Patient presents with   • Cough   • Wheezing     Cough, wheezing and shortness of air 3 weeks.  Started with allergies.  Concerns of pneumonia           Visit Vitals  /90 (BP Location: Left arm, Patient Position: Sitting, Cuff Size: Adult)   Pulse 82   Temp 99.8 °F (37.7 °C)   Ht 188 cm (74\")   Wt (!) 140 kg (308 lb)   SpO2 98%   BMI 39.54 kg/m²         Cough  This is a chronic problem. The current episode started 1 to 4 weeks ago. The problem has been unchanged. The cough is productive of purulent sputum (dry cough since Saturday. ). Associated symptoms include headaches, nasal congestion, shortness of breath and wheezing. Pertinent negatives include no chest pain, chills, ear congestion, ear pain, fever, heartburn, hemoptysis, myalgias, postnasal drip, rash, rhinorrhea, sore throat, sweats or weight loss. Nothing aggravates the symptoms. Risk factors for lung disease include occupational exposure. He has tried OTC cough suppressant for the symptoms. The treatment provided mild relief. There is no history of asthma, bronchiectasis, bronchitis, COPD, emphysema, environmental allergies or pneumonia.   Wheezing   This is a new problem. The current episode started yesterday. The problem occurs constantly. The problem has been unchanged. Associated symptoms include coughing, headaches, shortness of breath and sputum production (occasional). Pertinent negatives include no abdominal pain, chest pain, chills, coryza, diarrhea, ear pain, fever, hemoptysis, neck pain, rash, rhinorrhea, sore throat, swollen glands or vomiting. The symptoms are aggravated by exercise. He has tried nothing for the symptoms. The treatment provided no relief. There is no history of asthma, COPD or pneumonia.      Pt had negative covid last week and the week before.  Pt has cough and wheezing and shortness of breath.   Pt was exposed to a pt on Monday who didn't feel " well.  Pt has only had 2 Covid vaccines.       Pt has had Covid in the past, January 21.     Pt has good kidney function.  Reviewed medication list.   Pt is over weight.    The following portions of the patient's history were reviewed and updated as appropriate: allergies, current medications, past family history, past medical history, past social history, past surgical history and problem list.    Past Medical History:   Diagnosis Date   • Arthritis    • Asthma    • COVID-19 04/19/2023   • Kidney stone    • Low back pain    • Lumbar sprain    • Personal history of COVID-19 01/2021   • Pure hypercholesterolemia 03/30/2022      Past Surgical History:   Procedure Laterality Date   • TONSILLECTOMY  2011      Family History   Problem Relation Age of Onset   • Hypertension Father    • Heart attack Father         related to Covid   • Arthritis Father    • Migraine headaches Mother    • Arthritis Mother    • Asthma Mother       Social History     Socioeconomic History   • Marital status:    Tobacco Use   • Smoking status: Never   • Smokeless tobacco: Never   Vaping Use   • Vaping Use: Never used   Substance and Sexual Activity   • Alcohol use: Yes     Comment: OCC   • Drug use: Never   • Sexual activity: Yes     Partners: Male     Birth control/protection: None      Allergies   Allergen Reactions   • Hydrocodone Other (See Comments) and GI Intolerance     vomiting   • Oxycodone Nausea And Vomiting       Review of Systems   Constitutional: Negative for chills, fever and weight loss.   HENT: Negative for ear pain, postnasal drip, rhinorrhea and sore throat.    Respiratory: Positive for cough, sputum production (occasional), shortness of breath and wheezing. Negative for hemoptysis.    Cardiovascular: Negative for chest pain.   Gastrointestinal: Negative for abdominal pain, diarrhea, heartburn and vomiting.   Musculoskeletal: Negative for myalgias and neck pain.   Skin: Negative for rash.   Allergic/Immunologic:  Negative for environmental allergies.   Neurological: Positive for headaches.       Objective   Physical Exam  Vitals and nursing note reviewed.   Constitutional:       Appearance: He is well-developed.   HENT:      Head: Normocephalic.      Right Ear: External ear normal.      Left Ear: External ear normal.      Nose: Nose normal.   Eyes:      General: Lids are normal.      Conjunctiva/sclera: Conjunctivae normal.      Pupils: Pupils are equal, round, and reactive to light.   Neck:      Thyroid: No thyroid mass or thyromegaly.      Vascular: No carotid bruit.      Trachea: Trachea normal.   Cardiovascular:      Rate and Rhythm: Normal rate and regular rhythm.      Heart sounds: No murmur heard.  Pulmonary:      Effort: Pulmonary effort is normal. No respiratory distress.      Breath sounds: Rhonchi (RUL better with cough) present. No decreased breath sounds, wheezing or rales.   Chest:      Chest wall: No tenderness.   Abdominal:      General: Bowel sounds are normal.      Palpations: Abdomen is soft.      Tenderness: There is no abdominal tenderness.   Musculoskeletal:         General: Normal range of motion.      Cervical back: Normal range of motion and neck supple.   Skin:     General: Skin is warm and dry.   Neurological:      Mental Status: He is alert and oriented to person, place, and time.   Psychiatric:         Behavior: Behavior normal.         Assessment & Plan   Diagnoses and all orders for this visit:    1. Acute cough (Primary)  -     POCT SARS-CoV-2 Antigen ADRIANNE + Flu    2. COVID-19 virus infection  -     Nirmatrelvir&Ritonavir 300/100 (PAXLOVID) 20 x 150 MG & 10 x 100MG tablet therapy pack tablet; Take 3 tablets by mouth 2 (Two) Times a Day for 5 days.  Dispense: 30 each; Refill: 0    3. Wheezing  -     albuterol sulfate  (90 Base) MCG/ACT inhaler; Inhale 2 puffs Every 4 (Four) Hours As Needed for Wheezing.  Dispense: 18 g; Refill: 1  -     albuterol (PROVENTIL) (2.5 MG/3ML) 0.083% nebulizer  solution; Take 2.5 mg by nebulization Every 4 (Four) Hours As Needed for Wheezing.  Dispense: 25 each; Refill: 3    4. Transient elevated blood pressure        Hold flonase           Current Outpatient Medications:   •  baclofen (LIORESAL) 10 MG tablet, Take 1 tablet by mouth Daily. Can take up to 3 tab daily, Disp: , Rfl:   •  esomeprazole (nexIUM) 20 MG capsule, Take 1 capsule by mouth Every Morning Before Breakfast., Disp: 30 capsule, Rfl: 2  •  fexofenadine (Allegra Allergy) 180 MG tablet, Take 1 tablet by mouth Daily., Disp: 90 tablet, Rfl: 1  •  KLS Aller-Jose 50 MCG/ACT nasal spray, , Disp: , Rfl:   •  losartan (COZAAR) 100 MG tablet, TAKE ONE TABLET BY MOUTH ONE TIME DAILY, Disp: 90 tablet, Rfl: 0  •  magnesium 30 MG tablet, Take 1 tablet by mouth., Disp: , Rfl:   •  montelukast (Singulair) 10 MG tablet, Take 1 tablet by mouth Every Night., Disp: 30 tablet, Rfl: 3  •  Turmeric 500 MG capsule, Take  by mouth., Disp: , Rfl:   •  albuterol (PROVENTIL) (2.5 MG/3ML) 0.083% nebulizer solution, Take 2.5 mg by nebulization Every 4 (Four) Hours As Needed for Wheezing., Disp: 25 each, Rfl: 3  •  albuterol sulfate  (90 Base) MCG/ACT inhaler, Inhale 2 puffs Every 4 (Four) Hours As Needed for Wheezing., Disp: 18 g, Rfl: 1  •  Nirmatrelvir&Ritonavir 300/100 (PAXLOVID) 20 x 150 MG & 10 x 100MG tablet therapy pack tablet, Take 3 tablets by mouth 2 (Two) Times a Day for 5 days., Disp: 30 each, Rfl: 0    Return if symptoms worsen or fail to improve, for Recheck.   Recent Results (from the past 168 hour(s))   POCT SARS-CoV-2 Antigen ADRIANNE + Flu    Collection Time: 04/19/23  1:57 PM    Specimen: Swab   Result Value Ref Range    SARS Antigen Detected (A) Not Detected, Presumptive Negative    Influenza A Antigen ADRIANNE Not Detected Not Detected    Influenza B Antigen ADRIANNE Not Detected Not Detected    Internal Control Passed Passed    Lot Number [id number]     Expiration Date 3/16/2024

## 2023-04-24 ENCOUNTER — PATIENT MESSAGE (OUTPATIENT)
Dept: INTERNAL MEDICINE | Facility: CLINIC | Age: 38
End: 2023-04-24
Payer: COMMERCIAL

## 2023-04-25 NOTE — TELEPHONE ENCOUNTER
From: Serge Narayanan  To: Shilpi Morales  Sent: 4/24/2023 1:32 PM EDT  Subject: Finished Paxlovid now I have questions lol     Afternoon     I was just wondering how in your opinion I would do after Paxlovid. I have read a bunch online regarding relapse of some patients, granted it seems their symptoms had been more severe than mine. I literally have only had the mild cough and that only appears in between doses. Other than that the wheezing/shortness of breath is gone.     I am returning to work tomorrow is there anything OTC that I can take other than the mucinex DM for say my dry cough if it gets in the way. (Sadly the pearls didn’t work for me at least in the past).     Thank you for everything     Kodi Narayanan

## 2023-05-02 DIAGNOSIS — I10 PRIMARY HYPERTENSION: ICD-10-CM

## 2023-05-02 RX ORDER — LOSARTAN POTASSIUM 100 MG/1
TABLET ORAL
Qty: 30 TABLET | Refills: 0 | Status: SHIPPED | OUTPATIENT
Start: 2023-05-02

## 2023-05-02 NOTE — TELEPHONE ENCOUNTER
Last seen 4/19 and 11/16  for acute visit. Last filled 1/31 for 90 day.  No appointment scheduled. LM that due for an appointment

## 2023-05-26 DIAGNOSIS — I10 PRIMARY HYPERTENSION: ICD-10-CM

## 2023-05-26 RX ORDER — LOSARTAN POTASSIUM 100 MG/1
TABLET ORAL
Qty: 14 TABLET | Refills: 0 | Status: SHIPPED | OUTPATIENT
Start: 2023-05-26 | End: 2023-06-02 | Stop reason: SDUPTHER

## 2023-06-01 DIAGNOSIS — K21.9 GASTROESOPHAGEAL REFLUX DISEASE, UNSPECIFIED WHETHER ESOPHAGITIS PRESENT: ICD-10-CM

## 2023-06-01 RX ORDER — ESOMEPRAZOLE 20 MG/1
CAPSULE, DELAYED RELEASE ORAL
Qty: 30 CAPSULE | Refills: 1 | Status: SHIPPED | OUTPATIENT
Start: 2023-06-01

## 2023-06-02 ENCOUNTER — TELEPHONE (OUTPATIENT)
Dept: INTERNAL MEDICINE | Facility: CLINIC | Age: 38
End: 2023-06-02
Payer: COMMERCIAL

## 2023-06-02 DIAGNOSIS — I10 PRIMARY HYPERTENSION: ICD-10-CM

## 2023-06-02 RX ORDER — LOSARTAN POTASSIUM 100 MG/1
100 TABLET ORAL DAILY
Qty: 90 TABLET | Refills: 0 | Status: SHIPPED | OUTPATIENT
Start: 2023-06-02

## 2023-06-05 ENCOUNTER — PATIENT MESSAGE (OUTPATIENT)
Dept: INTERNAL MEDICINE | Facility: CLINIC | Age: 38
End: 2023-06-05
Payer: COMMERCIAL

## 2023-06-05 DIAGNOSIS — J45.909 ASTHMA, UNSPECIFIED ASTHMA SEVERITY, UNSPECIFIED WHETHER COMPLICATED, UNSPECIFIED WHETHER PERSISTENT: Primary | ICD-10-CM

## 2023-06-05 DIAGNOSIS — J30.9 ALLERGIC RHINITIS, UNSPECIFIED SEASONALITY, UNSPECIFIED TRIGGER: ICD-10-CM

## 2023-06-09 NOTE — TELEPHONE ENCOUNTER
From: Serge Narayanan  To: Shilpi Andrew  Sent: 6/5/2023 8:23 AM EDT  Subject: Allergy Testing and Asthma Question    Good Morning!    Just had a question. I was wondering if it were possible to be retested for my allergies. I haven’t had it done since I was a young kid. I just feel now I have more sensitive allergies if that makes sense. I take and allergy pill every day and it helps but it seems like I need more. Wondering also I am taking the singular for my asthma cough and my inhaler as needed but it seems that it’s only helping for about half the day but like 3-4 in the afternoon my cough is coming back and it’s just an asthma cough. Should I start taking it in the morning instead of night or increase it?     If I need to come see you let me know     Thank you !!

## 2023-08-01 DIAGNOSIS — K21.9 GASTROESOPHAGEAL REFLUX DISEASE, UNSPECIFIED WHETHER ESOPHAGITIS PRESENT: ICD-10-CM

## 2023-08-01 RX ORDER — ESOMEPRAZOLE 20 MG/1
CAPSULE, DELAYED RELEASE ORAL
Qty: 30 CAPSULE | Refills: 0 | Status: SHIPPED | OUTPATIENT
Start: 2023-08-01

## 2023-08-14 RX ORDER — FEXOFENADINE HYDROCHLORIDE 180 MG/1
TABLET, FILM COATED ORAL
Qty: 90 TABLET | Refills: 0 | Status: SHIPPED | OUTPATIENT
Start: 2023-08-14

## 2023-08-17 ENCOUNTER — TELEPHONE (OUTPATIENT)
Dept: INTERNAL MEDICINE | Facility: CLINIC | Age: 38
End: 2023-08-17
Payer: COMMERCIAL

## 2023-08-17 ENCOUNTER — OFFICE VISIT (OUTPATIENT)
Dept: INTERNAL MEDICINE | Facility: CLINIC | Age: 38
End: 2023-08-17
Payer: COMMERCIAL

## 2023-08-17 VITALS
WEIGHT: 315 LBS | OXYGEN SATURATION: 96 % | RESPIRATION RATE: 18 BRPM | HEART RATE: 68 BPM | SYSTOLIC BLOOD PRESSURE: 124 MMHG | DIASTOLIC BLOOD PRESSURE: 76 MMHG | TEMPERATURE: 97.5 F | BODY MASS INDEX: 40.43 KG/M2 | HEIGHT: 74 IN

## 2023-08-17 DIAGNOSIS — J45.909 ASTHMA, UNSPECIFIED ASTHMA SEVERITY, UNSPECIFIED WHETHER COMPLICATED, UNSPECIFIED WHETHER PERSISTENT: Primary | ICD-10-CM

## 2023-08-17 DIAGNOSIS — K21.9 GASTROESOPHAGEAL REFLUX DISEASE, UNSPECIFIED WHETHER ESOPHAGITIS PRESENT: ICD-10-CM

## 2023-08-17 RX ORDER — ESOMEPRAZOLE MAGNESIUM 40 MG/1
40 CAPSULE, DELAYED RELEASE ORAL
Qty: 30 CAPSULE | Refills: 3 | Status: SHIPPED | OUTPATIENT
Start: 2023-08-17

## 2023-08-17 RX ORDER — MONTELUKAST SODIUM 10 MG/1
10 TABLET ORAL EVERY EVENING
Qty: 30 TABLET | Refills: 3 | Status: SHIPPED | OUTPATIENT
Start: 2023-08-17

## 2023-08-17 RX ORDER — OMEPRAZOLE 40 MG/1
40 CAPSULE, DELAYED RELEASE ORAL DAILY
Qty: 30 CAPSULE | Refills: 3 | Status: SHIPPED | OUTPATIENT
Start: 2023-08-17 | End: 2023-08-17

## 2023-08-17 RX ORDER — MONTELUKAST SODIUM 10 MG/1
TABLET ORAL
Qty: 30 TABLET | Refills: 0 | OUTPATIENT
Start: 2023-08-17

## 2023-08-17 NOTE — PROGRESS NOTES
Office Note     Name: Serge Narayanan    : 1985     MRN: [id number]     Chief Complaint  No chief complaint on file.    Subjective     History of Present Illness:  Serge Narayanan is a 37 y.o. male who presents today for f/u on allergy and GERD medications    asthma- chronic, stable-takes montelukast for asthma. Uses albuterol sulfate prn, maybe uses rescue inhaler 1x/month.     GERD- chronic, well controlled with esomeprazole 40 mg. Has been taking 2 pills of the 20 mg, says 20mg was not working. Requesting to change prescription to 40 mg.    Gained 50 lbs in the past 2 years, states had lumbar back injury and has been unable to exercise. Normally is very active. States he will be able to resume exercising in the next few months. Eats good diet.    HTN- chronic, well controlled on losartan 100 mg. BP looks great today!      Review of Systems   All other systems reviewed and are negative.    Past Medical History:   Diagnosis Date    Arthritis     Asthma     COVID-19 2023    Kidney stone     Low back pain     Lumbar sprain     Personal history of COVID-19 2021    Pure hypercholesterolemia 2022     Past Surgical History:   Procedure Laterality Date    TONSILLECTOMY         Current Outpatient Medications:     albuterol sulfate  (90 Base) MCG/ACT inhaler, Inhale 2 puffs Every 4 (Four) Hours As Needed for Wheezing., Disp: 18 g, Rfl: 1    azelastine (ASTELIN) 0.1 % nasal spray, 2 sprays into the nostril(s) as directed by provider., Disp: , Rfl:     baclofen (LIORESAL) 10 MG tablet, Take 1 tablet by mouth Daily. Can take up to 3 tab daily, Disp: , Rfl:     KLS Aller-Fex 180 MG tablet, TAKE ONE TABLET BY MOUTH ONE TIME DAILY, Disp: 90 tablet, Rfl: 0    KLS Aller-Jose 50 MCG/ACT nasal spray, , Disp: , Rfl:     losartan (COZAAR) 100 MG tablet, Take 1 tablet by mouth Daily., Disp: 90 tablet, Rfl: 0    magnesium 30 MG tablet, Take 1 tablet by mouth., Disp: , Rfl:     montelukast  "(SINGULAIR) 10 MG tablet, Take 1 tablet by mouth Every Evening., Disp: 30 tablet, Rfl: 3    Turmeric 500 MG capsule, Take  by mouth., Disp: , Rfl:     esomeprazole (nexIUM) 40 MG capsule, Take 1 capsule by mouth Every Morning Before Breakfast., Disp: 30 capsule, Rfl: 3  Allergies   Allergen Reactions    Hydrocodone Other (See Comments) and GI Intolerance     vomiting    Oxycodone Nausea And Vomiting       Objective     Vital Signs  /76 (BP Location: Right arm, Patient Position: Sitting, Cuff Size: Adult)   Pulse 68   Temp 97.5 øF (36.4 øC) (Infrared)   Resp 18   Ht 188 cm (74\")   Wt (!) 144 kg (318 lb 6.4 oz)   SpO2 96%   BMI 40.88 kg/mý   Estimated body mass index is 40.88 kg/mý as calculated from the following:    Height as of this encounter: 188 cm (74\").    Weight as of this encounter: 144 kg (318 lb 6.4 oz).    Class 3 Severe Obesity (BMI >=40). Obesity-related health conditions include the following: hypertension. Obesity is unchanged. BMI is is above average; BMI management plan is completed. We discussed low calorie, low carb based diet program, portion control, and increasing exercise.      Physical Exam  Constitutional:       Appearance: Normal appearance.   Cardiovascular:      Rate and Rhythm: Normal rate and regular rhythm.      Pulses: Normal pulses.      Heart sounds: Normal heart sounds. No murmur heard.  Pulmonary:      Effort: Pulmonary effort is normal.      Breath sounds: Normal breath sounds. No wheezing or rhonchi.   Abdominal:      General: Bowel sounds are normal.      Palpations: Abdomen is soft.      Tenderness: There is no abdominal tenderness.   Neurological:      General: No focal deficit present.      Mental Status: He is alert and oriented to person, place, and time. Mental status is at baseline.   Psychiatric:         Mood and Affect: Mood normal.           Assessment and Plan     Diagnoses and all orders for this visit:    1. Asthma, unspecified asthma severity, " unspecified whether complicated, unspecified whether persistent (Primary)  -     montelukast (SINGULAIR) 10 MG tablet; Take 1 tablet by mouth Every Evening.  Dispense: 30 tablet; Refill: 3    2. Gastroesophageal reflux disease, unspecified whether esophagitis present  -     Discontinue: omeprazole (priLOSEC) 40 MG capsule; Take 1 capsule by mouth Daily.  Dispense: 30 capsule; Refill: 3  -     esomeprazole (nexIUM) 40 MG capsule; Take 1 capsule by mouth Every Morning Before Breakfast.  Dispense: 30 capsule; Refill: 3      Follow Up  Return for Next scheduled follow up, with Dr. Morales.    Stanley Diamond submitted by the patient for this visit:  Primary Reason for Visit (Submitted on 8/15/2023)  What is the primary reason for your visit?: Other  Other (Submitted on 8/15/2023)  Please describe your symptoms.: Follow up  Have you had these symptoms before?: Yes  How long have you been having these symptoms?: Greater than 2 weeks  Please list any medications you are currently taking for this condition.: Needing refulls for prescriptions , The nurse who answers messages for Dr Morales is stating I need to come in for a follow up to get them

## 2023-08-17 NOTE — TELEPHONE ENCOUNTER
Pharmacy notified that new script was sent.  He checked and they have received it and will get ready for patient

## 2023-08-17 NOTE — TELEPHONE ENCOUNTER
DELETE AFTER REVIEWING: Telephone encounter to be sent to the clinical pool.    Pharmacy Name:  [id number] - [place], [place] - [address] - [phone] [facility] [phone]      Pharmacy representative name: ROWAN    Pharmacy representative phone number: [phone]     What medication are you calling in regards to: OMEPRAZOLE     What question does the pharmacy have: ACCORDING TO THE PATIENT GENERIC  NEXIUM 20 MG TWO A DAY WAS SUPPOSE TO BE CALLED IN TO HAVE A DAILY DOSE OF 40 MG .     Who is the provider that prescribed the medication: KOMAL RAMOS, MICHAEL

## 2023-08-30 DIAGNOSIS — I10 PRIMARY HYPERTENSION: ICD-10-CM

## 2023-08-30 RX ORDER — LOSARTAN POTASSIUM 100 MG/1
TABLET ORAL
Qty: 90 TABLET | Refills: 0 | Status: SHIPPED | OUTPATIENT
Start: 2023-08-30

## 2023-11-13 RX ORDER — FEXOFENADINE HYDROCHLORIDE 180 MG/1
TABLET, FILM COATED ORAL
Qty: 90 TABLET | Refills: 0 | Status: SHIPPED | OUTPATIENT
Start: 2023-11-13

## 2023-11-17 ENCOUNTER — TELEPHONE (OUTPATIENT)
Dept: INTERNAL MEDICINE | Facility: CLINIC | Age: 38
End: 2023-11-17

## 2023-11-17 NOTE — TELEPHONE ENCOUNTER
BHUPINDER GRAY HAS CALLED TO NOTIFY PCP THAT PATIENT QUALIFIES FOR A  THAT INCLUDES DIFFERENT SERVICES AND COUNSELING. BHUPINDER WILL BE SENDING A FAX WITH THE CLINICAL DOCUMENTATION REQUEST AND FAX WILL INCLUDE PROGRAM DETAILS AS WELL.    CALL BACK NUMBER [phone]

## 2024-01-04 ENCOUNTER — LAB (OUTPATIENT)
Dept: INTERNAL MEDICINE | Facility: CLINIC | Age: 39
End: 2024-01-04
Payer: COMMERCIAL

## 2024-01-04 ENCOUNTER — OFFICE VISIT (OUTPATIENT)
Dept: INTERNAL MEDICINE | Facility: CLINIC | Age: 39
End: 2024-01-04
Payer: COMMERCIAL

## 2024-01-04 VITALS
TEMPERATURE: 97.8 F | WEIGHT: 315 LBS | DIASTOLIC BLOOD PRESSURE: 90 MMHG | BODY MASS INDEX: 40.43 KG/M2 | HEART RATE: 63 BPM | OXYGEN SATURATION: 97 % | HEIGHT: 74 IN | SYSTOLIC BLOOD PRESSURE: 136 MMHG

## 2024-01-04 DIAGNOSIS — K21.9 GASTROESOPHAGEAL REFLUX DISEASE, UNSPECIFIED WHETHER ESOPHAGITIS PRESENT: ICD-10-CM

## 2024-01-04 DIAGNOSIS — I10 PRIMARY HYPERTENSION: Primary | ICD-10-CM

## 2024-01-04 DIAGNOSIS — E66.01 CLASS 3 SEVERE OBESITY WITH SERIOUS COMORBIDITY AND BODY MASS INDEX (BMI) OF 40.0 TO 44.9 IN ADULT, UNSPECIFIED OBESITY TYPE: ICD-10-CM

## 2024-01-04 DIAGNOSIS — I10 PRIMARY HYPERTENSION: ICD-10-CM

## 2024-01-04 DIAGNOSIS — J45.909 ASTHMA, UNSPECIFIED ASTHMA SEVERITY, UNSPECIFIED WHETHER COMPLICATED, UNSPECIFIED WHETHER PERSISTENT: ICD-10-CM

## 2024-01-04 DIAGNOSIS — E78.00 PURE HYPERCHOLESTEROLEMIA: ICD-10-CM

## 2024-01-04 LAB
ALBUMIN SERPL-MCNC: 4.3 G/DL (ref 3.5–5.2)
ALBUMIN/GLOB SERPL: 1.7 G/DL
ALP SERPL-CCNC: 104 U/L (ref 39–117)
ALT SERPL W P-5'-P-CCNC: 56 U/L (ref 1–41)
ANION GAP SERPL CALCULATED.3IONS-SCNC: 11.8 MMOL/L (ref 5–15)
AST SERPL-CCNC: 26 U/L (ref 1–40)
BASOPHILS # BLD AUTO: 0.03 10*3/MM3 (ref 0–0.2)
BASOPHILS NFR BLD AUTO: 0.4 % (ref 0–1.5)
BILIRUB SERPL-MCNC: 0.5 MG/DL (ref 0–1.2)
BUN SERPL-MCNC: 16 MG/DL (ref 6–20)
BUN/CREAT SERPL: 15.4 (ref 7–25)
CALCIUM SPEC-SCNC: 9.3 MG/DL (ref 8.6–10.5)
CHLORIDE SERPL-SCNC: 104 MMOL/L (ref 98–107)
CHOLEST SERPL-MCNC: 250 MG/DL (ref 0–200)
CO2 SERPL-SCNC: 22.2 MMOL/L (ref 22–29)
CREAT SERPL-MCNC: 1.04 MG/DL (ref 0.76–1.27)
DEPRECATED RDW RBC AUTO: 42.6 FL (ref 37–54)
EGFRCR SERPLBLD CKD-EPI 2021: 95.4 ML/MIN/1.73
EOSINOPHIL # BLD AUTO: 0.23 10*3/MM3 (ref 0–0.4)
EOSINOPHIL NFR BLD AUTO: 2.7 % (ref 0.3–6.2)
ERYTHROCYTE [DISTWIDTH] IN BLOOD BY AUTOMATED COUNT: 13.1 % (ref 12.3–15.4)
GLOBULIN UR ELPH-MCNC: 2.6 GM/DL
GLUCOSE SERPL-MCNC: 105 MG/DL (ref 65–99)
HCT VFR BLD AUTO: 45.9 % (ref 37.5–51)
HDLC SERPL-MCNC: 37 MG/DL (ref 40–60)
HGB BLD-MCNC: 15.9 G/DL (ref 13–17.7)
IMM GRANULOCYTES # BLD AUTO: 0.02 10*3/MM3 (ref 0–0.05)
IMM GRANULOCYTES NFR BLD AUTO: 0.2 % (ref 0–0.5)
LDLC SERPL CALC-MCNC: 169 MG/DL (ref 0–100)
LDLC/HDLC SERPL: 4.5 {RATIO}
LYMPHOCYTES # BLD AUTO: 2.28 10*3/MM3 (ref 0.7–3.1)
LYMPHOCYTES NFR BLD AUTO: 26.8 % (ref 19.6–45.3)
MCH RBC QN AUTO: 30.8 PG (ref 26.6–33)
MCHC RBC AUTO-ENTMCNC: 34.6 G/DL (ref 31.5–35.7)
MCV RBC AUTO: 88.8 FL (ref 79–97)
MONOCYTES # BLD AUTO: 0.74 10*3/MM3 (ref 0.1–0.9)
MONOCYTES NFR BLD AUTO: 8.7 % (ref 5–12)
NEUTROPHILS NFR BLD AUTO: 5.21 10*3/MM3 (ref 1.7–7)
NEUTROPHILS NFR BLD AUTO: 61.2 % (ref 42.7–76)
NRBC BLD AUTO-RTO: 0 /100 WBC (ref 0–0.2)
PLATELET # BLD AUTO: 249 10*3/MM3 (ref 140–450)
PMV BLD AUTO: 10.3 FL (ref 6–12)
POTASSIUM SERPL-SCNC: 4.5 MMOL/L (ref 3.5–5.2)
PROT SERPL-MCNC: 6.9 G/DL (ref 6–8.5)
RBC # BLD AUTO: 5.17 10*6/MM3 (ref 4.14–5.8)
SODIUM SERPL-SCNC: 138 MMOL/L (ref 136–145)
TRIGL SERPL-MCNC: 232 MG/DL (ref 0–150)
TSH SERPL DL<=0.05 MIU/L-ACNC: 1.49 UIU/ML (ref 0.27–4.2)
VLDLC SERPL-MCNC: 44 MG/DL (ref 5–40)
WBC NRBC COR # BLD AUTO: 8.51 10*3/MM3 (ref 3.4–10.8)

## 2024-01-04 PROCEDURE — 85025 COMPLETE CBC W/AUTO DIFF WBC: CPT | Performed by: FAMILY MEDICINE

## 2024-01-04 PROCEDURE — 80061 LIPID PANEL: CPT | Performed by: FAMILY MEDICINE

## 2024-01-04 PROCEDURE — 36415 COLL VENOUS BLD VENIPUNCTURE: CPT | Performed by: FAMILY MEDICINE

## 2024-01-04 PROCEDURE — 84443 ASSAY THYROID STIM HORMONE: CPT | Performed by: FAMILY MEDICINE

## 2024-01-04 PROCEDURE — 80053 COMPREHEN METABOLIC PANEL: CPT | Performed by: FAMILY MEDICINE

## 2024-01-04 PROCEDURE — 99214 OFFICE O/P EST MOD 30 MIN: CPT | Performed by: FAMILY MEDICINE

## 2024-01-04 RX ORDER — LOSARTAN POTASSIUM 100 MG/1
100 TABLET ORAL DAILY
Qty: 90 TABLET | Refills: 1 | Status: SHIPPED | OUTPATIENT
Start: 2024-01-04

## 2024-01-04 RX ORDER — MONTELUKAST SODIUM 10 MG/1
10 TABLET ORAL EVERY EVENING
Qty: 90 TABLET | Refills: 1 | Status: SHIPPED | OUTPATIENT
Start: 2024-01-04

## 2024-01-04 RX ORDER — DICLOFENAC SODIUM 75 MG/1
1 TABLET, DELAYED RELEASE ORAL 2 TIMES DAILY
COMMUNITY
Start: 2023-11-14

## 2024-01-04 RX ORDER — TIZANIDINE 4 MG/1
1 TABLET ORAL
COMMUNITY
Start: 2023-08-18

## 2024-01-04 RX ORDER — ESOMEPRAZOLE MAGNESIUM 40 MG/1
40 CAPSULE, DELAYED RELEASE ORAL
Qty: 90 CAPSULE | Refills: 1 | Status: SHIPPED | OUTPATIENT
Start: 2024-01-04

## 2024-01-04 RX ORDER — FEXOFENADINE HCL 180 MG/1
180 TABLET ORAL DAILY
Qty: 90 TABLET | Refills: 1 | Status: SHIPPED | OUTPATIENT
Start: 2024-01-04

## 2024-01-04 NOTE — PROGRESS NOTES
"Subjective   Serge Narayanan is a 38 y.o. male.     Chief Complaint   Patient presents with    Hypertension    Asthma    Heartburn       Visit Vitals  /90 (BP Location: Right arm, Patient Position: Sitting, Cuff Size: Adult)   Pulse 63   Temp 97.8 °F (36.6 °C)   Ht 188 cm (74\")   Wt (!) 148 kg (326 lb)   SpO2 97%   BMI 41.86 kg/m²       Wt Readings from Last 3 Encounters:   01/04/24 (!) 148 kg (326 lb)   08/17/23 (!) 144 kg (318 lb 6.4 oz)   08/02/23 (!) 140 kg (308 lb)       Hypertension  This is a chronic problem. The current episode started more than 1 year ago. The problem has been gradually worsening (ran out of meds) since onset. Pertinent negatives include no anxiety, blurred vision, chest pain, headaches, malaise/fatigue, neck pain, orthopnea, palpitations, peripheral edema, PND, shortness of breath or sweats. There are no associated agents to hypertension. Risk factors for coronary artery disease include dyslipidemia, male gender, obesity and family history. Current antihypertension treatment includes angiotensin blockers. The current treatment provides significant improvement. Compliance problems include psychosocial issues.  There is no history of angina, kidney disease, CAD/MI, CVA, heart failure, left ventricular hypertrophy, PVD or retinopathy. There is no history of sleep apnea or a thyroid problem.   Heartburn  He reports no abdominal pain, no belching, no chest pain, no choking, no coughing, no dysphagia, no early satiety, no globus sensation, no heartburn, no hoarse voice, no nausea, no sore throat, no stridor, no tooth decay, no water brash or no wheezing. This is a chronic problem. The current episode started more than 1 year ago. The problem occurs rarely. The problem has been resolved. Pertinent negatives include no anemia, fatigue, melena, muscle weakness, orthopnea or weight loss. Risk factors include obesity. He has tried a PPI for the symptoms. The treatment provided significant " relief.   Asthma  There is no chest tightness, cough, difficulty breathing, frequent throat clearing, hemoptysis, hoarse voice, shortness of breath, sputum production or wheezing. This is a chronic problem. The current episode started more than 1 year ago. The problem occurs rarely. The problem has been resolved. Pertinent negatives include no appetite change, chest pain, dyspnea on exertion, ear congestion, ear pain, fever, headaches, heartburn, malaise/fatigue, myalgias, nasal congestion, orthopnea, PND, postnasal drip, rhinorrhea, sneezing, sore throat, sweats, trouble swallowing or weight loss. His symptoms are aggravated by change in weather. His symptoms are alleviated by leukotriene antagonist. He reports significant improvement on treatment. His past medical history is significant for asthma.   Obesity  This is a chronic problem. The current episode started more than 1 year ago. The problem occurs constantly. The problem has been unchanged. Pertinent negatives include no abdominal pain, anorexia, arthralgias, change in bowel habit, chest pain, chills, congestion, coughing, diaphoresis, fatigue, fever, headaches, joint swelling, myalgias, nausea, neck pain, numbness, rash, sore throat, swollen glands, urinary symptoms, vertigo, visual change, vomiting or weakness. Nothing aggravates the symptoms. The treatment provided no relief.      Pt reports that he ran out of BP meds 2 days ago.   Pt has been gaining weight since back surgery. Pt is not eating sweets.   Pt walks at work and after work.   The following portions of the patient's history were reviewed and updated as appropriate: allergies, current medications, past family history, past medical history, past social history, past surgical history, and problem list.    Past Medical History:   Diagnosis Date    Arthritis     Asthma     COVID-19 04/19/2023    Kidney stone     Low back pain     Lumbar sprain     Personal history of COVID-19 01/2021    Pure  hypercholesterolemia 03/30/2022      Past Surgical History:   Procedure Laterality Date    TONSILLECTOMY  2011      Family History   Problem Relation Age of Onset    Hypertension Father     Heart attack Father         related to Covid    Arthritis Father     Migraine headaches Mother     Arthritis Mother     Asthma Mother       Social History     Socioeconomic History    Marital status:    Tobacco Use    Smoking status: Never    Smokeless tobacco: Never   Vaping Use    Vaping Use: Never used   Substance and Sexual Activity    Alcohol use: Yes     Comment: OCC    Drug use: Never    Sexual activity: Yes     Partners: Male     Birth control/protection: None      Allergies   Allergen Reactions    Hydrocodone Other (See Comments) and GI Intolerance     vomiting    Oxycodone Nausea And Vomiting       Review of Systems   Constitutional:  Negative for appetite change, chills, diaphoresis, fatigue, fever, malaise/fatigue and weight loss.   HENT:  Negative for congestion, ear pain, hoarse voice, postnasal drip, rhinorrhea, sneezing, sore throat and trouble swallowing.    Eyes:  Negative for blurred vision.   Respiratory:  Negative for cough, hemoptysis, sputum production, choking, shortness of breath and wheezing.    Cardiovascular:  Negative for chest pain, dyspnea on exertion, palpitations, orthopnea and PND.   Gastrointestinal:  Negative for abdominal pain, anorexia, change in bowel habit, dysphagia, heartburn, melena, nausea and vomiting.   Musculoskeletal:  Negative for arthralgias, joint swelling, myalgias, muscle weakness and neck pain.   Skin:  Negative for rash.   Neurological:  Negative for vertigo, weakness, numbness and headaches.       Objective   Physical Exam  Vitals and nursing note reviewed.   Constitutional:       Appearance: He is well-developed.   HENT:      Head: Normocephalic.      Right Ear: External ear normal.      Left Ear: External ear normal.      Nose: Nose normal.   Eyes:      General:  Lids are normal.      Conjunctiva/sclera: Conjunctivae normal.      Pupils: Pupils are equal, round, and reactive to light.   Neck:      Thyroid: No thyroid mass or thyromegaly.      Vascular: No carotid bruit.      Trachea: Trachea normal.   Cardiovascular:      Rate and Rhythm: Normal rate and regular rhythm.      Heart sounds: No murmur heard.  Pulmonary:      Effort: Pulmonary effort is normal. No respiratory distress.      Breath sounds: Normal breath sounds. No decreased breath sounds, wheezing, rhonchi or rales.   Chest:      Chest wall: No tenderness.   Abdominal:      General: Bowel sounds are normal.      Palpations: Abdomen is soft.      Tenderness: There is no abdominal tenderness.   Musculoskeletal:         General: Normal range of motion.      Cervical back: Normal range of motion and neck supple.   Skin:     General: Skin is warm and dry.   Neurological:      Mental Status: He is alert and oriented to person, place, and time.   Psychiatric:         Behavior: Behavior normal.         Assessment & Plan   Problems Addressed this Visit          Cardiac and Vasculature    Primary hypertension - Primary    Relevant Medications    losartan (COZAAR) 100 MG tablet    Other Relevant Orders    Comprehensive Metabolic Panel (Completed)    Lipid Panel (Completed)    TSH Rfx On Abnormal To Free T4 (Completed)    CBC & Differential (Completed)    Pure hypercholesterolemia    Relevant Orders    Comprehensive Metabolic Panel (Completed)    Lipid Panel (Completed)       Pulmonary and Pneumonias    Asthma    Relevant Medications    montelukast (SINGULAIR) 10 MG tablet     Other Visit Diagnoses       Gastroesophageal reflux disease, unspecified whether esophagitis present        Relevant Medications    esomeprazole (nexIUM) 40 MG capsule    Class 3 severe obesity with serious comorbidity and body mass index (BMI) of 40.0 to 44.9 in adult, unspecified obesity type        Relevant Orders    Ambulatory Referral to Weight  Management Program          Diagnoses         Codes Comments    Primary hypertension    -  Primary ICD-10-CM: I10  ICD-9-CM: 401.9     Gastroesophageal reflux disease, unspecified whether esophagitis present     ICD-10-CM: K21.9  ICD-9-CM: 530.81     Asthma, unspecified asthma severity, unspecified whether complicated, unspecified whether persistent     ICD-10-CM: J45.909  ICD-9-CM: 493.90     Pure hypercholesterolemia     ICD-10-CM: E78.00  ICD-9-CM: 272.0     Class 3 severe obesity with serious comorbidity and body mass index (BMI) of 40.0 to 44.9 in adult, unspecified obesity type     ICD-10-CM: E66.01, Z68.41  ICD-9-CM: 278.01, V85.41                        Current Outpatient Medications:     albuterol sulfate  (90 Base) MCG/ACT inhaler, Inhale 2 puffs Every 4 (Four) Hours As Needed for Wheezing., Disp: 18 g, Rfl: 1    azelastine (ASTELIN) 0.1 % nasal spray, 2 sprays into the nostril(s) as directed by provider., Disp: , Rfl:     diclofenac (VOLTAREN) 75 MG EC tablet, Take 1 tablet by mouth 2 (Two) Times a Day., Disp: , Rfl:     esomeprazole (nexIUM) 40 MG capsule, Take 1 capsule by mouth Every Morning Before Breakfast., Disp: 90 capsule, Rfl: 1    fexofenadine (KLS Aller-Fex) 180 MG tablet, Take 1 tablet by mouth Daily., Disp: 90 tablet, Rfl: 1    KLS Aller-Jose 50 MCG/ACT nasal spray, , Disp: , Rfl:     losartan (COZAAR) 100 MG tablet, Take 1 tablet by mouth Daily., Disp: 90 tablet, Rfl: 1    magnesium 30 MG tablet, Take 1 tablet by mouth., Disp: , Rfl:     montelukast (SINGULAIR) 10 MG tablet, Take 1 tablet by mouth Every Evening., Disp: 90 tablet, Rfl: 1    tiZANidine (ZANAFLEX) 4 MG tablet, Take 1 tablet by mouth every night at bedtime., Disp: , Rfl:     Turmeric 500 MG capsule, Take  by mouth., Disp: , Rfl:     Return in about 6 months (around 7/4/2024), or if symptoms worsen or fail to improve, for Recheck, Annual call with BP reading in 1-2 weeks.  Answers submitted by the patient for this  visit:  Primary Reason for Visit (Submitted on 1/2/2024)  What is the primary reason for your visit?: Other  Other (Submitted on 1/2/2024)  Please describe your symptoms.: Prescripton refills and weight loss  Have you had these symptoms before?: No  How long have you been having these symptoms?: Greater than 2 weeks

## 2024-01-08 ENCOUNTER — TELEPHONE (OUTPATIENT)
Dept: INTERNAL MEDICINE | Facility: CLINIC | Age: 39
End: 2024-01-08
Payer: COMMERCIAL

## 2024-01-08 NOTE — TELEPHONE ENCOUNTER
----- Message from Shilpi GARCIA MD sent at 1/7/2024  7:35 AM EST -----  Please call the patient regarding his abnormal result. Liver enzyme ALT remaining elevated.  Fatty liver is a common cause of elevated liver enzymes. Weight loss is the treatment for fatty liver. Please check a liver ultrasound. Orders are in the computer.    Cholesterol and triglycerides elevated. Please follow a low animal fat diet that is also low in sugar, low in junk food, low in sweet drinks and low in alcohol.  Please increase the amount of fiber in your diet as well as increasing your daily exercise, such as walking.  Need to consider statins

## 2024-06-10 ENCOUNTER — OFFICE VISIT (OUTPATIENT)
Dept: INTERNAL MEDICINE | Facility: CLINIC | Age: 39
End: 2024-06-10
Payer: COMMERCIAL

## 2024-06-10 ENCOUNTER — LAB (OUTPATIENT)
Dept: INTERNAL MEDICINE | Facility: CLINIC | Age: 39
End: 2024-06-10
Payer: COMMERCIAL

## 2024-06-10 VITALS
OXYGEN SATURATION: 97 % | DIASTOLIC BLOOD PRESSURE: 82 MMHG | TEMPERATURE: 97.3 F | BODY MASS INDEX: 40.43 KG/M2 | HEART RATE: 62 BPM | WEIGHT: 315 LBS | HEIGHT: 74 IN | SYSTOLIC BLOOD PRESSURE: 130 MMHG

## 2024-06-10 DIAGNOSIS — E78.2 MIXED HYPERLIPIDEMIA: ICD-10-CM

## 2024-06-10 DIAGNOSIS — R73.09 ABNORMAL GLUCOSE: ICD-10-CM

## 2024-06-10 DIAGNOSIS — R74.01 ELEVATED TRANSAMINASE LEVEL: Primary | ICD-10-CM

## 2024-06-10 DIAGNOSIS — K21.9 GASTROESOPHAGEAL REFLUX DISEASE, UNSPECIFIED WHETHER ESOPHAGITIS PRESENT: ICD-10-CM

## 2024-06-10 DIAGNOSIS — I10 PRIMARY HYPERTENSION: Primary | ICD-10-CM

## 2024-06-10 DIAGNOSIS — I10 PRIMARY HYPERTENSION: ICD-10-CM

## 2024-06-10 DIAGNOSIS — R74.01 ELEVATED TRANSAMINASE LEVEL: ICD-10-CM

## 2024-06-10 DIAGNOSIS — J30.9 ALLERGIC RHINITIS, UNSPECIFIED SEASONALITY, UNSPECIFIED TRIGGER: ICD-10-CM

## 2024-06-10 DIAGNOSIS — E66.01 CLASS 3 SEVERE OBESITY WITH SERIOUS COMORBIDITY AND BODY MASS INDEX (BMI) OF 40.0 TO 44.9 IN ADULT, UNSPECIFIED OBESITY TYPE: ICD-10-CM

## 2024-06-10 LAB
ALBUMIN SERPL-MCNC: 4.5 G/DL (ref 3.5–5.2)
ALBUMIN/GLOB SERPL: 1.9 G/DL
ALP SERPL-CCNC: 94 U/L (ref 39–117)
ALT SERPL W P-5'-P-CCNC: 63 U/L (ref 1–41)
ANION GAP SERPL CALCULATED.3IONS-SCNC: 9 MMOL/L (ref 5–15)
AST SERPL-CCNC: 29 U/L (ref 1–40)
BILIRUB SERPL-MCNC: 0.4 MG/DL (ref 0–1.2)
BUN SERPL-MCNC: 20 MG/DL (ref 6–20)
BUN/CREAT SERPL: 21.3 (ref 7–25)
CALCIUM SPEC-SCNC: 9.3 MG/DL (ref 8.6–10.5)
CHLORIDE SERPL-SCNC: 106 MMOL/L (ref 98–107)
CHOLEST SERPL-MCNC: 256 MG/DL (ref 0–200)
CO2 SERPL-SCNC: 25 MMOL/L (ref 22–29)
CREAT SERPL-MCNC: 0.94 MG/DL (ref 0.76–1.27)
EGFRCR SERPLBLD CKD-EPI 2021: 107.7 ML/MIN/1.73
EXPIRATION DATE: NORMAL
GLOBULIN UR ELPH-MCNC: 2.4 GM/DL
GLUCOSE SERPL-MCNC: 113 MG/DL (ref 65–99)
HAV IGM SERPL QL IA: NORMAL
HBA1C MFR BLD: 5.7 % (ref 4.5–5.7)
HBV CORE IGM SERPL QL IA: NORMAL
HBV SURFACE AG SERPL QL IA: NORMAL
HCV AB SER QL: NORMAL
HDLC SERPL-MCNC: 42 MG/DL (ref 40–60)
LDLC SERPL CALC-MCNC: 165 MG/DL (ref 0–100)
LDLC/HDLC SERPL: 3.84 {RATIO}
Lab: NORMAL
POTASSIUM SERPL-SCNC: 5 MMOL/L (ref 3.5–5.2)
PROT SERPL-MCNC: 6.9 G/DL (ref 6–8.5)
SODIUM SERPL-SCNC: 140 MMOL/L (ref 136–145)
TRIGL SERPL-MCNC: 263 MG/DL (ref 0–150)
VLDLC SERPL-MCNC: 49 MG/DL (ref 5–40)

## 2024-06-10 PROCEDURE — 36415 COLL VENOUS BLD VENIPUNCTURE: CPT | Performed by: FAMILY MEDICINE

## 2024-06-10 PROCEDURE — 99214 OFFICE O/P EST MOD 30 MIN: CPT | Performed by: FAMILY MEDICINE

## 2024-06-10 PROCEDURE — 80053 COMPREHEN METABOLIC PANEL: CPT | Performed by: FAMILY MEDICINE

## 2024-06-10 PROCEDURE — 80061 LIPID PANEL: CPT | Performed by: FAMILY MEDICINE

## 2024-06-10 PROCEDURE — 83036 HEMOGLOBIN GLYCOSYLATED A1C: CPT | Performed by: FAMILY MEDICINE

## 2024-06-10 PROCEDURE — 80074 ACUTE HEPATITIS PANEL: CPT | Performed by: FAMILY MEDICINE

## 2024-06-10 RX ORDER — FLUTICASONE PROPIONATE 50 UG/1
2 SPRAY, METERED NASAL DAILY
Qty: 16 G | Refills: 4 | Status: SHIPPED | OUTPATIENT
Start: 2024-06-10

## 2024-06-10 RX ORDER — LOSARTAN POTASSIUM 100 MG/1
100 TABLET ORAL DAILY
Qty: 90 TABLET | Refills: 1 | Status: SHIPPED | OUTPATIENT
Start: 2024-06-10

## 2024-06-10 RX ORDER — ESOMEPRAZOLE MAGNESIUM 40 MG/1
40 CAPSULE, DELAYED RELEASE ORAL
Qty: 90 CAPSULE | Refills: 1 | Status: SHIPPED | OUTPATIENT
Start: 2024-06-10

## 2024-06-10 RX ORDER — FEXOFENADINE HCL 180 MG/1
180 TABLET ORAL DAILY
Qty: 90 TABLET | Refills: 1 | Status: SHIPPED | OUTPATIENT
Start: 2024-06-10

## 2024-06-10 NOTE — PROGRESS NOTES
"Subjective   Serge Narayanan is a 38 y.o. male.     Chief Complaint   Patient presents with    Hypertension    Asthma    Obesity     Discuss Ozempic         Visit Vitals  /82 (BP Location: Right arm, Patient Position: Sitting, Cuff Size: Adult)   Pulse 62   Temp 97.3 °F (36.3 °C)   Ht 188 cm (74\")   Wt (!) 147 kg (324 lb)   SpO2 97%   BMI 41.60 kg/m²       Wt Readings from Last 3 Encounters:   06/10/24 (!) 147 kg (324 lb)   01/04/24 (!) 148 kg (326 lb)   08/17/23 (!) 144 kg (318 lb 6.4 oz)         Hypertension  This is a chronic problem. The current episode started more than 1 year ago. The problem is unchanged. The problem is controlled. Pertinent negatives include no anxiety, blurred vision, chest pain, headaches, malaise/fatigue, neck pain, orthopnea, palpitations, peripheral edema, PND, shortness of breath or sweats. There are no associated agents to hypertension. Risk factors for coronary artery disease include male gender, obesity, family history and dyslipidemia. Current antihypertension treatment includes angiotensin blockers. The current treatment provides significant improvement. There are no compliance problems.  There is no history of angina, kidney disease, CAD/MI, CVA, heart failure, left ventricular hypertrophy, PVD or retinopathy. There is no history of chronic renal disease, sleep apnea or a thyroid problem.   Asthma  There is no chest tightness, cough, difficulty breathing, frequent throat clearing, hemoptysis, hoarse voice, shortness of breath, sputum production or wheezing. This is a chronic problem. The problem occurs rarely. The problem has been rapidly improving. Associated symptoms include nasal congestion and postnasal drip. Pertinent negatives include no appetite change, chest pain, dyspnea on exertion, ear congestion, ear pain, fever, headaches, heartburn, malaise/fatigue, myalgias, orthopnea, PND, rhinorrhea, sneezing, sore throat, sweats, trouble swallowing or weight loss. His " symptoms are aggravated by nothing. His symptoms are alleviated by beta-agonist. He reports significant improvement on treatment. His symptoms are not alleviated by beta-agonist. There are no known risk factors for lung disease. His past medical history is significant for asthma.   Obesity  This is a chronic problem. The current episode started more than 1 year ago. The problem occurs constantly. The problem has been unchanged. Pertinent negatives include no abdominal pain, anorexia, arthralgias, change in bowel habit, chest pain, chills, congestion, coughing, diaphoresis, fatigue, fever, headaches, joint swelling, myalgias, nausea, neck pain, numbness, rash, sore throat, swollen glands, urinary symptoms, vertigo, visual change, vomiting or weakness. The symptoms are aggravated by eating. Treatments tried: diet change. The treatment provided no relief.   Hyperlipidemia  This is a chronic problem. The current episode started more than 1 year ago. The problem is uncontrolled. Recent lipid tests were reviewed and are high. Exacerbating diseases include diabetes and obesity. He has no history of chronic renal disease, hypothyroidism, liver disease or nephrotic syndrome. Pertinent negatives include no chest pain, focal sensory loss, focal weakness, leg pain, myalgias or shortness of breath. Current antihyperlipidemic treatment includes diet change. Improvement on treatment: pending. Risk factors for coronary artery disease include dyslipidemia, family history, hypertension, male sex and obesity.   Blood Sugar Problem  This is a new problem. The current episode started more than 1 month ago. The problem has been unchanged. Pertinent negatives include no abdominal pain, anorexia, arthralgias, change in bowel habit, chest pain, chills, congestion, coughing, diaphoresis, fatigue, fever, headaches, joint swelling, myalgias, nausea, neck pain, numbness, rash, sore throat, swollen glands, urinary symptoms, vertigo, visual  change, vomiting or weakness. Treatments tried: diet change. Improvement on treatment: pending.   Heartburn  He reports no abdominal pain, no belching, no chest pain, no choking, no coughing, no dysphagia, no early satiety, no globus sensation, no heartburn, no hoarse voice, no nausea, no sore throat, no stridor, no tooth decay, no water brash or no wheezing. This is a chronic problem. The current episode started more than 1 year ago. The problem occurs rarely. The problem has been resolved. The symptoms are aggravated by certain foods. Pertinent negatives include no anemia, fatigue, melena, muscle weakness, orthopnea or weight loss. Risk factors include obesity. He has tried a PPI for the symptoms. The treatment provided significant relief.      Pt has DDD with bulge L3-L4 and has to walk only for exercise.   Pt has been seen by [facility] and [facility].  Pt had elevated glucose on last lab as well as elevated transaminase.   Pt states that no one in family has thyroid cancer or MEN.     The following portions of the patient's history were reviewed and updated as appropriate: allergies, current medications, past family history, past medical history, past social history, past surgical history, and problem list.    Past Medical History:   Diagnosis Date    Arthritis     Asthma     COVID-19 04/19/2023    HL (hearing loss) 2020    Kidney stone     Low back pain     Lumbar sprain     Personal history of COVID-19 01/2021    Pure hypercholesterolemia 03/30/2022      Past Surgical History:   Procedure Laterality Date    TONSILLECTOMY  2011      Family History   Problem Relation Age of Onset    Hypertension Father     Heart attack Father         related to Covid    Arthritis Father     Early death Father     Migraine headaches Mother     Arthritis Mother     Asthma Mother       Social History     Socioeconomic History    Marital status:    Tobacco Use    Smoking status: Never    Smokeless tobacco: Never   Vaping  Use    Vaping status: Never Used   Substance and Sexual Activity    Alcohol use: Yes     Comment: OCC    Drug use: Never    Sexual activity: Yes     Partners: Male     Birth control/protection: None, Same-sex partner      Allergies   Allergen Reactions    Hydrocodone Other (See Comments) and GI Intolerance     vomiting    Oxycodone Nausea And Vomiting       Review of Systems   Constitutional:  Negative for appetite change, chills, diaphoresis, fatigue, fever, malaise/fatigue and weight loss.   HENT:  Positive for postnasal drip. Negative for congestion, ear pain, hoarse voice, rhinorrhea, sneezing, sore throat and trouble swallowing.    Eyes:  Negative for blurred vision.   Respiratory:  Negative for cough, hemoptysis, sputum production, choking, shortness of breath and wheezing.    Cardiovascular:  Negative for chest pain, dyspnea on exertion, palpitations, orthopnea and PND.   Gastrointestinal:  Negative for abdominal pain, anorexia, change in bowel habit, dysphagia, heartburn, melena, nausea and vomiting.   Musculoskeletal:  Negative for arthralgias, joint swelling, myalgias, muscle weakness and neck pain.   Skin:  Negative for rash.   Neurological:  Negative for vertigo, focal weakness, weakness, numbness and headaches.       Objective   Physical Exam  Vitals and nursing note reviewed.   Constitutional:       Appearance: He is well-developed.   HENT:      Head: Normocephalic.      Right Ear: External ear normal.      Left Ear: External ear normal.      Nose: Nose normal.   Eyes:      General: Lids are normal.      Conjunctiva/sclera: Conjunctivae normal.      Pupils: Pupils are equal, round, and reactive to light.   Neck:      Thyroid: No thyroid mass or thyromegaly.      Vascular: No carotid bruit.      Trachea: Trachea normal.   Cardiovascular:      Rate and Rhythm: Normal rate and regular rhythm.      Heart sounds: No murmur heard.  Pulmonary:      Effort: Pulmonary effort is normal. No respiratory distress.       Breath sounds: Normal breath sounds. No decreased breath sounds, wheezing, rhonchi or rales.   Chest:      Chest wall: No tenderness.   Abdominal:      General: Bowel sounds are normal.      Palpations: Abdomen is soft.      Tenderness: There is no abdominal tenderness.   Musculoskeletal:         General: Normal range of motion.      Cervical back: Normal range of motion and neck supple.   Skin:     General: Skin is warm and dry.   Neurological:      Mental Status: He is alert and oriented to person, place, and time.   Psychiatric:         Behavior: Behavior normal.         Assessment & Plan   Problems Addressed this Visit          Cardiac and Vasculature    Primary hypertension - Primary    Relevant Medications    losartan (COZAAR) 100 MG tablet    Other Relevant Orders    Comprehensive Metabolic Panel    Lipid Panel       Endocrine and Metabolic    Abnormal glucose    Relevant Orders    POC Glycosylated Hemoglobin (Hb A1C) (Completed)     Other Visit Diagnoses       Gastroesophageal reflux disease, unspecified whether esophagitis present        Relevant Medications    esomeprazole (nexIUM) 40 MG capsule    Allergic rhinitis, unspecified seasonality, unspecified trigger        Relevant Medications    fexofenadine (KLS Aller-Fex) 180 MG tablet    KLS Aller-Jose 50 MCG/ACT nasal spray    Class 3 severe obesity with serious comorbidity and body mass index (BMI) of 40.0 to 44.9 in adult, unspecified obesity type        Mixed hyperlipidemia        Relevant Orders    Comprehensive Metabolic Panel    Lipid Panel    Elevated transaminase level        Relevant Orders    Hepatitis Panel, Acute          Diagnoses         Codes Comments    Primary hypertension    -  Primary ICD-10-CM: I10  ICD-9-CM: 401.9     Gastroesophageal reflux disease, unspecified whether esophagitis present     ICD-10-CM: K21.9  ICD-9-CM: 530.81     Allergic rhinitis, unspecified seasonality, unspecified trigger     ICD-10-CM: J30.9  ICD-9-CM: 477.9      Class 3 severe obesity with serious comorbidity and body mass index (BMI) of 40.0 to 44.9 in adult, unspecified obesity type     ICD-10-CM: E66.01, Z68.41  ICD-9-CM: 278.01, V85.41     Abnormal glucose     ICD-10-CM: R73.09  ICD-9-CM: 790.29     Mixed hyperlipidemia     ICD-10-CM: E78.2  ICD-9-CM: 272.2     Elevated transaminase level     ICD-10-CM: R74.01  ICD-9-CM: 790.4             Consider Wegovy/Zepbpound  Check LFT's, if elevated get liver ultrasound           Current Outpatient Medications:     albuterol sulfate  (90 Base) MCG/ACT inhaler, Inhale 2 puffs Every 4 (Four) Hours As Needed for Wheezing., Disp: 18 g, Rfl: 1    azelastine (ASTELIN) 0.1 % nasal spray, 2 sprays into the nostril(s) as directed by provider., Disp: , Rfl:     diclofenac (VOLTAREN) 75 MG EC tablet, Take 1 tablet by mouth 2 (Two) Times a Day., Disp: , Rfl:     esomeprazole (nexIUM) 40 MG capsule, Take 1 capsule by mouth Every Morning Before Breakfast., Disp: 90 capsule, Rfl: 1    fexofenadine (KLS Aller-Fex) 180 MG tablet, Take 1 tablet by mouth Daily., Disp: 90 tablet, Rfl: 1    KLS Aller-Jose 50 MCG/ACT nasal spray, 2 sprays into the nostril(s) as directed by provider Daily., Disp: 16 g, Rfl: 4    losartan (COZAAR) 100 MG tablet, Take 1 tablet by mouth Daily., Disp: 90 tablet, Rfl: 1    magnesium 30 MG tablet, Take 1 tablet by mouth., Disp: , Rfl:     tiZANidine (ZANAFLEX) 4 MG tablet, Take 1 tablet by mouth every night at bedtime., Disp: , Rfl:     Turmeric 500 MG capsule, Take  by mouth., Disp: , Rfl:     Return in about 3 months (around 9/10/2024), or if symptoms worsen or fail to improve, for Recheck.    Recent Results (from the past 5040 hour(s))   Comprehensive Metabolic Panel    Collection Time: 01/04/24  9:15 AM    Specimen: Arm, Right; Blood   Result Value Ref Range    Glucose 105 (H) 65 - 99 mg/dL    BUN 16 6 - 20 mg/dL    Creatinine 1.04 0.76 - 1.27 mg/dL    Sodium 138 136 - 145 mmol/L    Potassium 4.5 3.5 - 5.2  mmol/L    Chloride 104 98 - 107 mmol/L    CO2 22.2 22.0 - 29.0 mmol/L    Calcium 9.3 8.6 - 10.5 mg/dL    Total Protein 6.9 6.0 - 8.5 g/dL    Albumin 4.3 3.5 - 5.2 g/dL    ALT (SGPT) 56 (H) 1 - 41 U/L    AST (SGOT) 26 1 - 40 U/L    Alkaline Phosphatase 104 39 - 117 U/L    Total Bilirubin 0.5 0.0 - 1.2 mg/dL    Globulin 2.6 gm/dL    A/G Ratio 1.7 g/dL    BUN/Creatinine Ratio 15.4 7.0 - 25.0    Anion Gap 11.8 5.0 - 15.0 mmol/L    eGFR 95.4 >60.0 mL/min/1.73   Lipid Panel    Collection Time: 01/04/24  9:15 AM    Specimen: Arm, Right; Blood   Result Value Ref Range    Total Cholesterol 250 (H) 0 - 200 mg/dL    Triglycerides 232 (H) 0 - 150 mg/dL    HDL Cholesterol 37 (L) 40 - 60 mg/dL    LDL Cholesterol  169 (H) 0 - 100 mg/dL    VLDL Cholesterol 44 (H) 5 - 40 mg/dL    LDL/HDL Ratio 4.50    TSH Rfx On Abnormal To Free T4    Collection Time: 01/04/24  9:15 AM    Specimen: Arm, Right; Blood   Result Value Ref Range    TSH 1.490 0.270 - 4.200 uIU/mL   CBC Auto Differential    Collection Time: 01/04/24  9:15 AM    Specimen: Arm, Right; Blood   Result Value Ref Range    WBC 8.51 3.40 - 10.80 10*3/mm3    RBC 5.17 4.14 - 5.80 10*6/mm3    Hemoglobin 15.9 13.0 - 17.7 g/dL    Hematocrit 45.9 37.5 - 51.0 %    MCV 88.8 79.0 - 97.0 fL    MCH 30.8 26.6 - 33.0 pg    MCHC 34.6 31.5 - 35.7 g/dL    RDW 13.1 12.3 - 15.4 %    RDW-SD 42.6 37.0 - 54.0 fl    MPV 10.3 6.0 - 12.0 fL    Platelets 249 140 - 450 10*3/mm3    Neutrophil % 61.2 42.7 - 76.0 %    Lymphocyte % 26.8 19.6 - 45.3 %    Monocyte % 8.7 5.0 - 12.0 %    Eosinophil % 2.7 0.3 - 6.2 %    Basophil % 0.4 0.0 - 1.5 %    Immature Grans % 0.2 0.0 - 0.5 %    Neutrophils, Absolute 5.21 1.70 - 7.00 10*3/mm3    Lymphocytes, Absolute 2.28 0.70 - 3.10 10*3/mm3    Monocytes, Absolute 0.74 0.10 - 0.90 10*3/mm3    Eosinophils, Absolute 0.23 0.00 - 0.40 10*3/mm3    Basophils, Absolute 0.03 0.00 - 0.20 10*3/mm3    Immature Grans, Absolute 0.02 0.00 - 0.05 10*3/mm3    nRBC 0.0 0.0 - 0.2 /100 WBC    POC Glycosylated Hemoglobin (Hb A1C)    Collection Time: 06/10/24  9:03 AM    Specimen: Blood   Result Value Ref Range    Hemoglobin A1C 5.7 4.5 - 5.7 %    Lot Number [id number]     Expiration Date 2/12/2026      Hemoglobin A1C   Date Value Ref Range Status   06/10/2024 5.7 4.5 - 5.7 % Final            Answers submitted by the patient for this visit:  Primary Reason for Visit (Submitted on 6/10/2024)  What is the primary reason for your visit?: Other  Other (Submitted on 6/10/2024)  Please describe your symptoms.: Looking for assistance with weightloss and prescription refills  Have you had these symptoms before?: No  How long have you been having these symptoms?: Greater than 2 weeks

## 2024-06-11 ENCOUNTER — TELEPHONE (OUTPATIENT)
Dept: INTERNAL MEDICINE | Facility: CLINIC | Age: 39
End: 2024-06-11
Payer: COMMERCIAL

## 2024-06-11 DIAGNOSIS — E78.2 MIXED HYPERLIPIDEMIA: Primary | ICD-10-CM

## 2024-06-11 NOTE — TELEPHONE ENCOUNTER
----- Message from Shilpi Morales sent at 6/10/2024  6:44 PM EDT -----  Please call the patient regarding his abnormal result.  Cholesterol and triglyceride remain elevated.  Please follow a low animal fat, low sugar, low alcohol diet.      We need to add statins.  Which pharmacy does he want to use?  L    iver enzymes remain elevated.  Order will be placed for liver ultrasound.  Fatty liver is a common cause of elevated liver enzymes.  Treatment for fatty liver is weight loss.

## 2024-06-12 RX ORDER — ATORVASTATIN CALCIUM 20 MG/1
20 TABLET, FILM COATED ORAL DAILY
Qty: 90 TABLET | Refills: 0 | Status: SHIPPED | OUTPATIENT
Start: 2024-06-12

## 2024-06-27 ENCOUNTER — HOSPITAL ENCOUNTER (OUTPATIENT)
Dept: ULTRASOUND IMAGING | Facility: HOSPITAL | Age: 39
Discharge: HOME | End: 2024-06-27
Admitting: FAMILY MEDICINE
Payer: COMMERCIAL

## 2024-06-27 DIAGNOSIS — R74.01 ELEVATED TRANSAMINASE LEVEL: ICD-10-CM

## 2024-06-27 PROCEDURE — 76705 ECHO EXAM OF ABDOMEN: CPT

## 2024-07-01 ENCOUNTER — TELEPHONE (OUTPATIENT)
Dept: INTERNAL MEDICINE | Facility: CLINIC | Age: 39
End: 2024-07-01
Payer: COMMERCIAL

## 2024-07-01 PROBLEM — K76.0 FATTY LIVER: Status: ACTIVE | Noted: 2024-07-01

## 2024-07-01 NOTE — TELEPHONE ENCOUNTER
----- Message from Shilpi Morales sent at 7/1/2024  8:18 AM EDT -----  Liver ultrasound shows fatty liver.  Gallbladder looks good.  Right kidney looks good.  Treatment for fatty liver is weight loss.  Sometimes fatty liver will become inflamed and cause cirrhosis.  Sometimes even 5 pounds will make a difference.

## 2024-07-01 NOTE — TELEPHONE ENCOUNTER
"Name: Serge Narayanan \"Kodi\"      Relationship: Self      Best Callback Number: [phone]       HUB PROVIDED THE RELAY MESSAGE FROM THE OFFICE      PATIENT: VOICED UNDERSTANDING AND HAS NO FURTHER QUESTIONS AT THIS TIME    ADDITIONAL INFORMATION: THE PATIENT SAID THAT HE IS ALREADY DOWN 6 LBS.     "

## 2024-07-01 NOTE — TELEPHONE ENCOUNTER
HUB to relay:-- Message from Shilpi Morales sent at 7/1/2024  8:18 AM EDT -----  Liver ultrasound shows fatty liver.  Gallbladder looks good.  Right kidney looks good.  Treatment for fatty liver is weight loss.  Sometimes fatty liver will become inflamed and cause cirrhosis.  Sometimes even 5 pounds will make a difference.   Yes

## 2024-09-09 DIAGNOSIS — E78.2 MIXED HYPERLIPIDEMIA: ICD-10-CM

## 2024-09-10 RX ORDER — ATORVASTATIN CALCIUM 20 MG/1
20 TABLET, FILM COATED ORAL DAILY
Qty: 90 TABLET | Refills: 0 | Status: SHIPPED | OUTPATIENT
Start: 2024-09-10

## 2024-09-10 NOTE — TELEPHONE ENCOUNTER
Rx Refill Note  Requested Prescriptions     Pending Prescriptions Disp Refills    atorvastatin (LIPITOR) 20 MG tablet [Pharmacy Med Name: Atorvastatin Calcium Oral Tablet 20 MG] 90 tablet 0     Sig: TAKE ONE TABLET BY MOUTH ONE TIME DAILY      Last office visit with prescribing clinician: 6/10/2024   Last telemedicine visit with prescribing clinician: Visit date not found   Next office visit with prescribing clinician: Visit date not found       Dinorah Brian MA  09/10/24, 17:06 EDT

## 2024-11-29 ENCOUNTER — TELEPHONE (OUTPATIENT)
Dept: INTERNAL MEDICINE | Facility: CLINIC | Age: 39
End: 2024-11-29

## 2024-11-29 DIAGNOSIS — K21.9 GASTROESOPHAGEAL REFLUX DISEASE, UNSPECIFIED WHETHER ESOPHAGITIS PRESENT: ICD-10-CM

## 2024-12-02 RX ORDER — ESOMEPRAZOLE 20 MG/1
CAPSULE, DELAYED RELEASE ORAL
Qty: 28 CAPSULE | Refills: 0 | Status: SHIPPED | OUTPATIENT
Start: 2024-12-02 | End: 2024-12-04 | Stop reason: SDUPTHER

## 2024-12-02 NOTE — TELEPHONE ENCOUNTER
Rx Refill Note  Requested Prescriptions     Pending Prescriptions Disp Refills    esomeprazole (KLS Esomeprazole Magnesium) 20 MG capsule [Pharmacy Med Name: KLS Esomeprazole Magnesium Oral Capsule Delayed Release 20 MG] 28 capsule 0     Sig: TAKE TWO CAPSULES BY MOUTH IN THE MORNING before breakfast      Last office visit with prescribing clinician: 6/10/2024     Next office visit with prescribing clinician: 12/4/2024       Kimberlee Nina  12/02/24, 15:35 EST

## 2024-12-02 NOTE — TELEPHONE ENCOUNTER
REFILL REQUEST ESOMEPRAZOLE   LAST REFILL 6/10/24  LAST VISIT 6/10/24  CALLED PT TO SCHEDULE APPT, LFT VM WITH CB NUMBER    OK FOR HUB TO RELAY MESSAGE AND SCHEDULE APPT

## 2024-12-04 ENCOUNTER — OFFICE VISIT (OUTPATIENT)
Dept: INTERNAL MEDICINE | Facility: CLINIC | Age: 39
End: 2024-12-04
Payer: COMMERCIAL

## 2024-12-04 VITALS
TEMPERATURE: 97.7 F | DIASTOLIC BLOOD PRESSURE: 82 MMHG | OXYGEN SATURATION: 97 % | SYSTOLIC BLOOD PRESSURE: 128 MMHG | BODY MASS INDEX: 37.73 KG/M2 | HEART RATE: 63 BPM | HEIGHT: 74 IN | WEIGHT: 294 LBS

## 2024-12-04 DIAGNOSIS — K21.9 GASTROESOPHAGEAL REFLUX DISEASE, UNSPECIFIED WHETHER ESOPHAGITIS PRESENT: ICD-10-CM

## 2024-12-04 DIAGNOSIS — R74.8 ELEVATED LIVER ENZYMES: ICD-10-CM

## 2024-12-04 DIAGNOSIS — E78.2 MIXED HYPERLIPIDEMIA: ICD-10-CM

## 2024-12-04 DIAGNOSIS — J30.9 ALLERGIC RHINITIS, UNSPECIFIED SEASONALITY, UNSPECIFIED TRIGGER: ICD-10-CM

## 2024-12-04 DIAGNOSIS — R06.2 WHEEZING: ICD-10-CM

## 2024-12-04 DIAGNOSIS — R73.09 ABNORMAL GLUCOSE: ICD-10-CM

## 2024-12-04 DIAGNOSIS — I10 PRIMARY HYPERTENSION: Primary | ICD-10-CM

## 2024-12-04 PROCEDURE — 99214 OFFICE O/P EST MOD 30 MIN: CPT | Performed by: FAMILY MEDICINE

## 2024-12-04 RX ORDER — FEXOFENADINE HCL 180 MG/1
180 TABLET ORAL DAILY
Qty: 90 TABLET | Refills: 1 | Status: SHIPPED | OUTPATIENT
Start: 2024-12-04

## 2024-12-04 RX ORDER — ATORVASTATIN CALCIUM 20 MG/1
20 TABLET, FILM COATED ORAL DAILY
Qty: 90 TABLET | Refills: 1 | Status: SHIPPED | OUTPATIENT
Start: 2024-12-04

## 2024-12-04 RX ORDER — ALBUTEROL SULFATE 90 UG/1
2 INHALANT RESPIRATORY (INHALATION) EVERY 4 HOURS PRN
Qty: 18 G | Refills: 1 | Status: SHIPPED | OUTPATIENT
Start: 2024-12-04

## 2024-12-04 RX ORDER — LOSARTAN POTASSIUM 100 MG/1
100 TABLET ORAL DAILY
Qty: 90 TABLET | Refills: 1 | Status: SHIPPED | OUTPATIENT
Start: 2024-12-04

## 2024-12-04 RX ORDER — FLUTICASONE PROPIONATE 50 UG/1
2 SPRAY, METERED NASAL DAILY
Qty: 16 G | Refills: 4 | Status: SHIPPED | OUTPATIENT
Start: 2024-12-04

## 2024-12-04 NOTE — PROGRESS NOTES
"Subjective   Serge Narayanan is a 39 y.o. male.     Chief Complaint   Patient presents with    Hypertension    Hyperlipidemia    Heartburn       Visit Vitals  /82 (BP Location: Left arm, Patient Position: Sitting, Cuff Size: Adult)   Pulse 63   Temp 97.7 °F (36.5 °C)   Ht 188 cm (74\")   Wt 133 kg (294 lb)   SpO2 97%   BMI 37.75 kg/m²       Wt Readings from Last 3 Encounters:   12/04/24 133 kg (294 lb)   06/10/24 (!) 147 kg (324 lb)   01/04/24 (!) 148 kg (326 lb)         Hypertension  This is a chronic problem. The current episode started more than 1 year ago. The problem is unchanged. The problem is controlled. Pertinent negatives include no anxiety, blurred vision, chest pain, headaches, malaise/fatigue, neck pain, orthopnea, palpitations, peripheral edema, PND, shortness of breath or sweats. Risk factors for coronary artery disease include dyslipidemia, male gender, obesity and family history. Current antihypertension treatment includes angiotensin blockers. The current treatment provides significant improvement. There are no compliance problems.  There is no history of chronic renal disease, sleep apnea or a thyroid problem.   Hyperlipidemia  This is a chronic problem. The current episode started more than 1 year ago. The problem is controlled. Recent lipid tests were reviewed and are normal. Exacerbating diseases include obesity. He has no history of chronic renal disease, diabetes, hypothyroidism, liver disease or nephrotic syndrome. There are no known factors aggravating his hyperlipidemia. Pertinent negatives include no chest pain, focal sensory loss, focal weakness, leg pain, myalgias or shortness of breath. Current antihyperlipidemic treatment includes statins. The current treatment provides significant improvement of lipids.   Heartburn  He reports no abdominal pain, no belching, no chest pain, no choking, no coughing, no dysphagia, no early satiety, no globus sensation, no heartburn, no " hoarse voice, no nausea, no sore throat, no stridor, no tooth decay, no water brash or no wheezing. This is a chronic problem. The current episode started more than 1 year ago. The problem occurs rarely. The problem has been resolved. The symptoms are aggravated by certain foods. Associated symptoms include weight loss. Pertinent negatives include no anemia, fatigue, melena, muscle weakness or orthopnea. Risk factors include obesity. He has tried a PPI for the symptoms. The treatment provided significant relief.   Today's concern : Refills On prescriptions.   Symptoms are chronic.   Symptoms occur constantly. Pertinent negative symptoms include no abdominal pain, no anorexia, no joint pain, no change in stool, no chest pain, no chills, no congestion, no cough, no diaphoresis, no fatigue, no fever, no headaches, no joint swelling, no myalgias, no nausea, no neck pain, no numbness, no rash, no sore throat, no swollen glands, no dysuria, no vertigo, no visual change, no vomiting and no weakness.   Treatment and/or Medications comments include: losartan, atorvastatin   Asthma  There is no chest tightness, cough, difficulty breathing, frequent throat clearing, hemoptysis, hoarse voice, shortness of breath, sputum production or wheezing. This is a chronic problem. The current episode started more than 1 year ago. The problem occurs rarely. The problem has been waxing and waning. Associated symptoms include nasal congestion, rhinorrhea and weight loss. Pertinent negatives include no appetite change, chest pain, dyspnea on exertion, ear congestion, ear pain, fever, headaches, heartburn, malaise/fatigue, myalgias, orthopnea, PND, postnasal drip, sneezing, sore throat, sweats or trouble swallowing. His symptoms are aggravated by pollen and change in weather. His symptoms are alleviated by beta-agonist. He reports complete improvement on treatment. His past medical history is significant for asthma.      Pt has lost  significant weight on semaglutide.  Pt has had elevated glucose in the past.   Pt has had elevated liver enzymes in the past.    The following portions of the patient's history were reviewed and updated as appropriate: allergies, current medications, past family history, past medical history, past social history, past surgical history, and problem list.    Past Medical History:   Diagnosis Date    Arthritis     Asthma     COVID-19 04/19/2023    Fatty liver 07/01/2024    HL (hearing loss) 2020    Kidney stone     Low back pain     Lumbar sprain     Personal history of COVID-19 01/2021    Pure hypercholesterolemia 03/30/2022      Past Surgical History:   Procedure Laterality Date    TONSILLECTOMY  2011      Family History   Problem Relation Age of Onset    Hypertension Father     Heart attack Father         related to Covid    Arthritis Father     Early death Father     Migraine headaches Mother     Arthritis Mother     Asthma Mother       Social History     Socioeconomic History    Marital status:    Tobacco Use    Smoking status: Never    Smokeless tobacco: Never   Vaping Use    Vaping status: Never Used   Substance and Sexual Activity    Alcohol use: Yes     Comment: OCC    Drug use: Never    Sexual activity: Yes     Partners: Male     Birth control/protection: None, Partner of same sex      Allergies   Allergen Reactions    Hydrocodone GI Intolerance, Other (See Comments), Nausea Only and Nausea And Vomiting     vomiting    Note: makes pt vomnit    Oxycodone Nausea And Vomiting       Review of Systems   Constitutional:  Positive for weight loss. Negative for appetite change, chills, diaphoresis, fatigue, fever and malaise/fatigue.   HENT:  Positive for rhinorrhea. Negative for congestion, ear pain, hoarse voice, postnasal drip, sneezing, sore throat and trouble swallowing.    Eyes:  Negative for blurred vision.   Respiratory:  Negative for cough, hemoptysis, sputum production, choking, shortness of breath  and wheezing.    Cardiovascular:  Negative for chest pain, dyspnea on exertion, palpitations, orthopnea and PND.   Gastrointestinal:  Negative for abdominal pain, anorexia, dysphagia, heartburn, melena, nausea and vomiting.   Genitourinary:  Negative for dysuria.   Musculoskeletal:  Negative for joint pain, myalgias, muscle weakness and neck pain.   Skin:  Negative for rash.   Neurological:  Negative for vertigo, focal weakness, weakness, numbness and headaches.       Objective   Physical Exam  Vitals and nursing note reviewed.   Constitutional:       Appearance: He is well-developed.   HENT:      Head: Normocephalic.      Right Ear: External ear normal.      Left Ear: External ear normal.      Nose: Nose normal.   Eyes:      General: Lids are normal.      Conjunctiva/sclera: Conjunctivae normal.      Pupils: Pupils are equal, round, and reactive to light.   Neck:      Thyroid: No thyroid mass or thyromegaly.      Vascular: No carotid bruit.      Trachea: Trachea normal.   Cardiovascular:      Rate and Rhythm: Normal rate and regular rhythm.      Heart sounds: No murmur heard.  Pulmonary:      Effort: Pulmonary effort is normal. No respiratory distress.      Breath sounds: Normal breath sounds. No decreased breath sounds, wheezing, rhonchi or rales.   Chest:      Chest wall: No tenderness.   Abdominal:      General: Bowel sounds are normal.      Palpations: Abdomen is soft.      Tenderness: There is no abdominal tenderness.   Musculoskeletal:         General: Normal range of motion.      Cervical back: Normal range of motion and neck supple.   Skin:     General: Skin is warm and dry.   Neurological:      Mental Status: He is alert and oriented to person, place, and time.   Psychiatric:         Behavior: Behavior normal.         Assessment & Plan   Problems Addressed this Visit          Cardiac and Vasculature    Primary hypertension - Primary    Relevant Medications    losartan (COZAAR) 100 MG tablet    Other  Relevant Orders    Comprehensive Metabolic Panel    Lipid Panel       Endocrine and Metabolic    Abnormal glucose    Relevant Orders    Hemoglobin A1c     Other Visit Diagnoses       Mixed hyperlipidemia        Relevant Medications    atorvastatin (LIPITOR) 20 MG tablet    Other Relevant Orders    Comprehensive Metabolic Panel    Lipid Panel    Allergic rhinitis, unspecified seasonality, unspecified trigger        Relevant Medications    KLS Aller-Jose 50 MCG/ACT nasal spray    fexofenadine (KLS Aller-Fex) 180 MG tablet    Wheezing        Relevant Medications    albuterol sulfate  (90 Base) MCG/ACT inhaler    Gastroesophageal reflux disease, unspecified whether esophagitis present        Relevant Medications    esomeprazole (KLS Esomeprazole Magnesium) 20 MG capsule    Elevated liver enzymes        Relevant Orders    Gamma GT          Diagnoses         Codes Comments    Primary hypertension    -  Primary ICD-10-CM: I10  ICD-9-CM: 401.9     Mixed hyperlipidemia     ICD-10-CM: E78.2  ICD-9-CM: 272.2     Allergic rhinitis, unspecified seasonality, unspecified trigger     ICD-10-CM: J30.9  ICD-9-CM: 477.9     Wheezing     ICD-10-CM: R06.2  ICD-9-CM: 786.07     Gastroesophageal reflux disease, unspecified whether esophagitis present     ICD-10-CM: K21.9  ICD-9-CM: 530.81     Abnormal glucose     ICD-10-CM: R73.09  ICD-9-CM: 790.29     Elevated liver enzymes     ICD-10-CM: R74.8  ICD-9-CM: 790.5                        Current Outpatient Medications:     albuterol sulfate  (90 Base) MCG/ACT inhaler, Inhale 2 puffs Every 4 (Four) Hours As Needed for Wheezing., Disp: 18 g, Rfl: 1    atorvastatin (LIPITOR) 20 MG tablet, Take 1 tablet by mouth Daily., Disp: 90 tablet, Rfl: 1    diclofenac (VOLTAREN) 75 MG EC tablet, Take 1 tablet by mouth 2 (Two) Times a Day., Disp: , Rfl:     esomeprazole (KLS Esomeprazole Magnesium) 20 MG capsule, Take 1 capsule by mouth Every Morning Before Breakfast., Disp: 90 capsule, Rfl:  1    fexofenadine (KLS Aller-Fex) 180 MG tablet, Take 1 tablet by mouth Daily., Disp: 90 tablet, Rfl: 1    KLS Aller-Jose 50 MCG/ACT nasal spray, Administer 2 sprays into the nostril(s) as directed by provider Daily., Disp: 16 g, Rfl: 4    losartan (COZAAR) 100 MG tablet, Take 1 tablet by mouth Daily., Disp: 90 tablet, Rfl: 1    magnesium 30 MG tablet, Take 1 tablet by mouth., Disp: , Rfl:     tiZANidine (ZANAFLEX) 4 MG tablet, Take 1 tablet by mouth every night at bedtime., Disp: , Rfl:     Turmeric 500 MG capsule, Take  by mouth., Disp: , Rfl:     Semaglutide, 1 MG/DOSE, (OZEMPIC) 2 MG/1.5ML solution pen-injector, Inject 0.5 mg under the skin into the appropriate area as directed 1 (One) Time Per Week., Disp: , Rfl:     Return in 6 months (on 6/4/2025), or if symptoms worsen or fail to improve, for Annual physical, Recheck.  Answers submitted by the patient for this visit:  Primary Reason for Visit (Submitted on 12/3/2024)  What is the primary reason for your visit?: Problem Not Listed

## 2024-12-05 ENCOUNTER — LAB (OUTPATIENT)
Dept: LAB | Facility: HOSPITAL | Age: 39
End: 2024-12-05
Payer: COMMERCIAL

## 2024-12-05 DIAGNOSIS — R74.8 ELEVATED LIVER ENZYMES: ICD-10-CM

## 2024-12-05 DIAGNOSIS — E78.2 MIXED HYPERLIPIDEMIA: ICD-10-CM

## 2024-12-05 DIAGNOSIS — I10 PRIMARY HYPERTENSION: ICD-10-CM

## 2024-12-05 DIAGNOSIS — R73.09 ABNORMAL GLUCOSE: ICD-10-CM

## 2024-12-05 PROCEDURE — 83036 HEMOGLOBIN GLYCOSYLATED A1C: CPT

## 2024-12-05 PROCEDURE — 80061 LIPID PANEL: CPT

## 2024-12-05 PROCEDURE — 80053 COMPREHEN METABOLIC PANEL: CPT

## 2024-12-05 PROCEDURE — 82977 ASSAY OF GGT: CPT

## 2024-12-06 LAB
ALBUMIN SERPL-MCNC: 4.4 G/DL (ref 3.5–5.2)
ALBUMIN/GLOB SERPL: 1.8 G/DL
ALP SERPL-CCNC: 93 U/L (ref 39–117)
ALT SERPL W P-5'-P-CCNC: 69 U/L (ref 1–41)
ANION GAP SERPL CALCULATED.3IONS-SCNC: 9.8 MMOL/L (ref 5–15)
AST SERPL-CCNC: 30 U/L (ref 1–40)
BILIRUB SERPL-MCNC: 0.5 MG/DL (ref 0–1.2)
BUN SERPL-MCNC: 17 MG/DL (ref 6–20)
BUN/CREAT SERPL: 17.2 (ref 7–25)
CALCIUM SPEC-SCNC: 9.6 MG/DL (ref 8.6–10.5)
CHLORIDE SERPL-SCNC: 104 MMOL/L (ref 98–107)
CHOLEST SERPL-MCNC: 160 MG/DL (ref 0–200)
CO2 SERPL-SCNC: 23.2 MMOL/L (ref 22–29)
CREAT SERPL-MCNC: 0.99 MG/DL (ref 0.76–1.27)
EGFRCR SERPLBLD CKD-EPI 2021: 100.6 ML/MIN/1.73
GGT SERPL-CCNC: 31 U/L (ref 8–61)
GLOBULIN UR ELPH-MCNC: 2.5 GM/DL
GLUCOSE SERPL-MCNC: 98 MG/DL (ref 65–99)
HBA1C MFR BLD: 5.4 % (ref 4.8–5.6)
HDLC SERPL-MCNC: 48 MG/DL (ref 40–60)
LDLC SERPL CALC-MCNC: 96 MG/DL (ref 0–100)
LDLC/HDLC SERPL: 1.98 {RATIO}
POTASSIUM SERPL-SCNC: 4.9 MMOL/L (ref 3.5–5.2)
PROT SERPL-MCNC: 6.9 G/DL (ref 6–8.5)
SODIUM SERPL-SCNC: 137 MMOL/L (ref 136–145)
TRIGL SERPL-MCNC: 84 MG/DL (ref 0–150)
VLDLC SERPL-MCNC: 16 MG/DL (ref 5–40)

## 2025-01-30 ENCOUNTER — OFFICE VISIT (OUTPATIENT)
Dept: FAMILY MEDICINE CLINIC | Facility: CLINIC | Age: 40
End: 2025-01-30
Payer: COMMERCIAL

## 2025-01-30 VITALS
SYSTOLIC BLOOD PRESSURE: 125 MMHG | HEART RATE: 68 BPM | BODY MASS INDEX: 38.99 KG/M2 | OXYGEN SATURATION: 98 % | DIASTOLIC BLOOD PRESSURE: 80 MMHG | WEIGHT: 303.8 LBS | RESPIRATION RATE: 20 BRPM | TEMPERATURE: 97.3 F | HEIGHT: 74 IN

## 2025-01-30 DIAGNOSIS — R22.2 PALPABLE MASS OF LOWER BACK: Primary | ICD-10-CM

## 2025-01-30 PROCEDURE — 99213 OFFICE O/P EST LOW 20 MIN: CPT | Performed by: FAMILY MEDICINE

## 2025-01-30 RX ORDER — CYCLOBENZAPRINE HCL 10 MG
10 TABLET ORAL
COMMUNITY
Start: 2025-01-27

## 2025-01-30 NOTE — PROGRESS NOTES
"Chief Complaint   Patient presents with    New Pt / establish care     cyst / lump on low back      Found by [facility], told to see a PCP        Subjective      Serge Narayanan is a 39 y.o. who presents for concerns of a mass in the left lower back.  Patient sees [facility] and during recent assessment he had a mobile palpable nodule in the soft tissues of the low back that was tender to touch.  The examining nurse practitioner was concerned this may represent a lipoma or cyst but due to potential need for interventions for his back pain recommended he see a PCP for further assessment and imaging.    Objective   Vital Signs:  /80   Pulse 68   Temp 97.3 °F (36.3 °C)   Resp 20   Ht 188 cm (74\")   Wt (!) 138 kg (303 lb 12.8 oz)   SpO2 98%   BMI 39.01 kg/m²     Physical Exam  Vitals reviewed.   Musculoskeletal:        Back:       Comments: Patient has palpable tender soft tissue nodule bilaterally, but tender on the left.   Neurological:      Mental Status: He is alert.          Result Review                     Assessment and Plan  Diagnoses and all orders for this visit:    1. Palpable mass of lower back (Primary)  -     US abdomen limited; Future    Plan: Differential includes myofascial knots/trigger points versus cyst or lipoma.  Soft tissue ultrasound is been ordered        Follow Up  Return in about 4 months (around 5/30/2025) for Recheck.  Patient was given instructions and counseling regarding his condition or for health maintenance advice. Please see specific information pulled into the AVS if appropriate.       "

## 2025-02-05 ENCOUNTER — PATIENT ROUNDING (BHMG ONLY) (OUTPATIENT)
Dept: FAMILY MEDICINE CLINIC | Facility: CLINIC | Age: 40
End: 2025-02-05
Payer: COMMERCIAL

## 2025-02-13 ENCOUNTER — HOSPITAL ENCOUNTER (OUTPATIENT)
Dept: ULTRASOUND IMAGING | Facility: HOSPITAL | Age: 40
Discharge: HOME | End: 2025-02-13
Admitting: FAMILY MEDICINE
Payer: COMMERCIAL

## 2025-02-13 DIAGNOSIS — R22.2 PALPABLE MASS OF LOWER BACK: ICD-10-CM

## 2025-02-13 PROCEDURE — 76705 ECHO EXAM OF ABDOMEN: CPT

## 2025-02-28 ENCOUNTER — OFFICE VISIT (OUTPATIENT)
Dept: FAMILY MEDICINE CLINIC | Facility: CLINIC | Age: 40
End: 2025-02-28
Payer: COMMERCIAL

## 2025-02-28 VITALS
DIASTOLIC BLOOD PRESSURE: 80 MMHG | WEIGHT: 305.4 LBS | TEMPERATURE: 97.8 F | SYSTOLIC BLOOD PRESSURE: 120 MMHG | HEIGHT: 74 IN | OXYGEN SATURATION: 98 % | RESPIRATION RATE: 20 BRPM | BODY MASS INDEX: 39.19 KG/M2 | HEART RATE: 83 BPM

## 2025-02-28 DIAGNOSIS — I10 PRIMARY HYPERTENSION: ICD-10-CM

## 2025-02-28 DIAGNOSIS — G47.8 UNREFRESHED BY SLEEP: Primary | ICD-10-CM

## 2025-02-28 DIAGNOSIS — M25.562 PAIN OF LEFT PATELLOFEMORAL JOINT: ICD-10-CM

## 2025-02-28 DIAGNOSIS — G47.10 HYPERSOMNOLENCE: ICD-10-CM

## 2025-02-28 RX ORDER — METAXALONE 800 MG/1
TABLET ORAL
COMMUNITY
Start: 2025-02-03

## 2025-02-28 NOTE — PROGRESS NOTES
"Chief Complaint   Patient presents with    sleep issues      Pt staying fatigued all the time     Left knee pain        Subjective      Serge Narayanan is a 39 y.o. who presents for concerns of possible obstructive sleep apnea.  Despite getting 8 hours or more of sleep per night patient never feels rested upon awakening and experiences drowsiness during the day when seated.  His current job has him on his feet and as long as he is moving he has no problems.  However after being seated for short periods of time patient will become quite sleepy.  He will fall asleep after watching television for a few minutes.  He has irregular breathing habits when asleep and intermittent snoring according to his .    Patient also has a left medial knee pain.  This is recurrence of an old pain.  Previously he benefited from viscosupplementation.  He denies any injury but would like me examined in case there is an alternative diagnosis.  In the past pain improved with physical therapy but did not resolve which led to viscosupplementation    The following portions of the patient's history were reviewed and updated as appropriate: allergies, current medications, past family history, past medical history, past social history, past surgical history, and problem list.    Review of Systems    Objective   Vital Signs:  /80   Pulse 83   Temp 97.8 °F (36.6 °C)   Resp 20   Ht 188 cm (74\")   Wt (!) 139 kg (305 lb 6.4 oz)   SpO2 98%   BMI 39.21 kg/m²               Physical Exam  Vitals reviewed.   Constitutional:       Appearance: Normal appearance.   Musculoskeletal:      Left knee: Bony tenderness present. No effusion, erythema or crepitus. Normal range of motion. Tenderness present over the medial joint line. Normal alignment.   Neurological:      Mental Status: He is alert.          Result Review   The following data was reviewed by: Rusty Irving MD on 02/28/2025:    Data reviewed : Radiologic studies MRI " 2017 identifying Baker's cyst               Assessment and Plan  Diagnoses and all orders for this visit:    1. Unrefreshed by sleep (Primary)  -     Home Sleep Study; Future    2. Hypersomnolence  -     Home Sleep Study; Future    3. BMI 39.0-39.9,adult  -     Home Sleep Study; Future    4. Primary hypertension  -     Home Sleep Study; Future    5. Pain of left patellofemoral joint  -     Ambulatory Referral to Orthopedic Surgery    Plan  1.  Home sleep study will be arranged as patient has signs and symptoms of sleep apnea  2.  Patient will be referred back to [facility] orthopedics for his left patellofemoral joint pain        Follow Up  No follow-ups on file.  Patient was given instructions and counseling regarding his condition or for health maintenance advice. Please see specific information pulled into the AVS if appropriate.

## 2025-05-19 ENCOUNTER — HOSPITAL ENCOUNTER (OUTPATIENT)
Dept: SLEEP MEDICINE | Facility: HOSPITAL | Age: 40
Discharge: HOME | End: 2025-05-19
Admitting: FAMILY MEDICINE
Payer: COMMERCIAL

## 2025-05-19 VITALS — WEIGHT: 291.01 LBS | BODY MASS INDEX: 37.35 KG/M2 | HEIGHT: 74 IN

## 2025-05-19 DIAGNOSIS — G47.8 UNREFRESHED BY SLEEP: ICD-10-CM

## 2025-05-19 DIAGNOSIS — I10 PRIMARY HYPERTENSION: ICD-10-CM

## 2025-05-19 DIAGNOSIS — G47.10 HYPERSOMNOLENCE: ICD-10-CM

## 2025-05-19 PROCEDURE — G0399 HOME SLEEP TEST/TYPE 3 PORTA: HCPCS

## 2025-05-22 DIAGNOSIS — E78.2 MIXED HYPERLIPIDEMIA: ICD-10-CM

## 2025-05-22 DIAGNOSIS — I10 PRIMARY HYPERTENSION: ICD-10-CM

## 2025-05-22 DIAGNOSIS — J30.9 ALLERGIC RHINITIS, UNSPECIFIED SEASONALITY, UNSPECIFIED TRIGGER: ICD-10-CM

## 2025-05-22 RX ORDER — FEXOFENADINE HYDROCHLORIDE 180 MG/1
180 TABLET, FILM COATED ORAL DAILY
Qty: 90 TABLET | Refills: 0 | OUTPATIENT
Start: 2025-05-22

## 2025-05-22 RX ORDER — LOSARTAN POTASSIUM 100 MG/1
100 TABLET ORAL DAILY
Qty: 90 TABLET | Refills: 0 | OUTPATIENT
Start: 2025-05-22

## 2025-05-22 RX ORDER — ATORVASTATIN CALCIUM 20 MG/1
20 TABLET, FILM COATED ORAL DAILY
Qty: 90 TABLET | Refills: 0 | OUTPATIENT
Start: 2025-05-22

## 2025-06-02 ENCOUNTER — OFFICE VISIT (OUTPATIENT)
Dept: FAMILY MEDICINE CLINIC | Facility: CLINIC | Age: 40
End: 2025-06-02
Payer: COMMERCIAL

## 2025-06-02 VITALS
WEIGHT: 307.4 LBS | RESPIRATION RATE: 20 BRPM | DIASTOLIC BLOOD PRESSURE: 90 MMHG | OXYGEN SATURATION: 99 % | SYSTOLIC BLOOD PRESSURE: 140 MMHG | HEART RATE: 68 BPM | TEMPERATURE: 97.8 F | BODY MASS INDEX: 39.45 KG/M2 | HEIGHT: 74 IN

## 2025-06-02 DIAGNOSIS — I10 PRIMARY HYPERTENSION: ICD-10-CM

## 2025-06-02 DIAGNOSIS — G47.33 MODERATE OBSTRUCTIVE SLEEP APNEA: Primary | ICD-10-CM

## 2025-06-02 DIAGNOSIS — E66.812 CLASS 2 SEVERE OBESITY DUE TO EXCESS CALORIES WITH SERIOUS COMORBIDITY AND BODY MASS INDEX (BMI) OF 39.0 TO 39.9 IN ADULT: ICD-10-CM

## 2025-06-02 DIAGNOSIS — K21.9 GASTROESOPHAGEAL REFLUX DISEASE, UNSPECIFIED WHETHER ESOPHAGITIS PRESENT: ICD-10-CM

## 2025-06-02 DIAGNOSIS — J30.9 ALLERGIC RHINITIS, UNSPECIFIED SEASONALITY, UNSPECIFIED TRIGGER: ICD-10-CM

## 2025-06-02 DIAGNOSIS — E78.2 MIXED HYPERLIPIDEMIA: ICD-10-CM

## 2025-06-02 DIAGNOSIS — R06.2 WHEEZING: ICD-10-CM

## 2025-06-02 DIAGNOSIS — E66.01 CLASS 2 SEVERE OBESITY DUE TO EXCESS CALORIES WITH SERIOUS COMORBIDITY AND BODY MASS INDEX (BMI) OF 39.0 TO 39.9 IN ADULT: ICD-10-CM

## 2025-06-02 PROCEDURE — 99214 OFFICE O/P EST MOD 30 MIN: CPT | Performed by: FAMILY MEDICINE

## 2025-06-02 RX ORDER — LOSARTAN POTASSIUM 100 MG/1
100 TABLET ORAL DAILY
Qty: 90 TABLET | Refills: 1 | Status: SHIPPED | OUTPATIENT
Start: 2025-06-02

## 2025-06-02 RX ORDER — FEXOFENADINE HCL 180 MG/1
180 TABLET ORAL DAILY
Qty: 90 TABLET | Refills: 1 | Status: SHIPPED | OUTPATIENT
Start: 2025-06-02

## 2025-06-02 RX ORDER — ATORVASTATIN CALCIUM 20 MG/1
20 TABLET, FILM COATED ORAL DAILY
Qty: 90 TABLET | Refills: 1 | Status: SHIPPED | OUTPATIENT
Start: 2025-06-02

## 2025-06-02 NOTE — PROGRESS NOTES
"Chief Complaint   Patient presents with    Follow-up    Hypertension    Hyperlipidemia    Sleep Apnea       Subjective      Serge Narayanan is a 39 y.o. who presents for chronic care of hypertension, hypercholesterolemia.  Patient has recently been diagnosed with obstructive sleep apnea in the moderate range with an AHI of 24.  He is here to discuss treatment.  He is currently taking medications as prescribed.    The following portions of the patient's history were reviewed and updated as appropriate: allergies, current medications, past family history, past medical history, past social history, past surgical history, and problem list.    Review of Systems    Objective   Vital Signs:  /90   Pulse 68   Temp 97.8 °F (36.6 °C)   Resp 20   Ht 188 cm (74.02\")   Wt (!) 139 kg (307 lb 6.4 oz)   SpO2 99%   BMI 39.45 kg/m²               Physical Exam  Vitals reviewed.   Constitutional:       Appearance: Normal appearance.   Neurological:      Mental Status: He is alert.          Result Review                     Assessment and Plan  Diagnoses and all orders for this visit:    1. Moderate obstructive sleep apnea (Primary)  Assessment & Plan:  Patient is newly identified.  Begin auto CPAP with initial settings of 8-18 mmHg.  Orders will be sent to [facility] as this is insurance is preferred DME.  Patient will also be initiated on Zepbound for his moderate sleep apnea with gradual up titration to 10 mg weekly.  Reassess in 6 months    Orders:  -     PAP Therapy  -     Tirzepatide-Weight Management (ZEPBOUND) 2.5 MG/0.5ML solution auto-injector; Inject 0.5 mL under the skin into the appropriate area as directed 1 (One) Time Per Week.  Dispense: 2 mL; Refill: 0    2. Primary hypertension  Assessment & Plan:  Hypertension is stable and controlled  Continue current treatment regimen.  Blood pressure will be reassessed in 6 months.    Orders:  -     losartan (COZAAR) 100 MG tablet; Take 1 tablet by mouth Daily.  " Dispense: 90 tablet; Refill: 1    3. Mixed hyperlipidemia  -     atorvastatin (LIPITOR) 20 MG tablet; Take 1 tablet by mouth Daily.  Dispense: 90 tablet; Refill: 1    4. Gastroesophageal reflux disease, unspecified whether esophagitis present  -     esomeprazole (KLS Esomeprazole Magnesium) 20 MG capsule; Take 1 capsule by mouth Every Morning Before Breakfast.  Dispense: 90 capsule; Refill: 1    5. Wheezing    6. Allergic rhinitis, unspecified seasonality, unspecified trigger  -     fexofenadine (KLS Aller-Fex) 180 MG tablet; Take 1 tablet by mouth Daily.  Dispense: 90 tablet; Refill: 1    7. Body mass index (BMI) of 39.0 to 39.9 in adult  -     Tirzepatide-Weight Management (ZEPBOUND) 2.5 MG/0.5ML solution auto-injector; Inject 0.5 mL under the skin into the appropriate area as directed 1 (One) Time Per Week.  Dispense: 2 mL; Refill: 0    8. Class 2 severe obesity due to excess calories with serious comorbidity and body mass index (BMI) of 39.0 to 39.9 in adult            Follow Up  Return in about 6 months (around 12/2/2025) for Annual.  Patient was given instructions and counseling regarding his condition or for health maintenance advice. Please see specific information pulled into the AVS if appropriate.

## 2025-06-02 NOTE — ASSESSMENT & PLAN NOTE
Lipid abnormalities are stable    Plan:  Continue same medication/s without change.      Discussed medication dosage, use, side effects, and goals of treatment in detail.    Counseled patient on lifestyle modifications to help control hyperlipidemia.     Patient Treatment Goals:   LDL goal is under 100    Followup in 6 months.  
Hypertension is stable and controlled  Continue current treatment regimen.  Blood pressure will be reassessed in 6 months.  
Patient is newly identified.  Begin auto CPAP with initial settings of 8-18 mmHg.  Orders will be sent to [facility] as this is insurance is preferred DME.  Patient will also be initiated on Zepbound for his moderate sleep apnea with gradual up titration to 10 mg weekly.  Reassess in 6 months  
Unknown

## 2025-06-04 ENCOUNTER — PATIENT MESSAGE (OUTPATIENT)
Dept: FAMILY MEDICINE CLINIC | Facility: CLINIC | Age: 40
End: 2025-06-04
Payer: COMMERCIAL

## 2025-06-04 DIAGNOSIS — E66.812 CLASS 2 SEVERE OBESITY DUE TO EXCESS CALORIES WITH SERIOUS COMORBIDITY AND BODY MASS INDEX (BMI) OF 39.0 TO 39.9 IN ADULT: ICD-10-CM

## 2025-06-04 DIAGNOSIS — E66.01 CLASS 2 SEVERE OBESITY DUE TO EXCESS CALORIES WITH SERIOUS COMORBIDITY AND BODY MASS INDEX (BMI) OF 39.0 TO 39.9 IN ADULT: ICD-10-CM

## 2025-06-05 RX ORDER — TIRZEPATIDE 5 MG/.5ML
5 INJECTION, SOLUTION SUBCUTANEOUS WEEKLY
Qty: 2 ML | Refills: 1 | Status: SHIPPED | OUTPATIENT
Start: 2025-06-05

## 2025-07-25 ENCOUNTER — OFFICE VISIT (OUTPATIENT)
Dept: FAMILY MEDICINE CLINIC | Facility: CLINIC | Age: 40
End: 2025-07-25
Payer: COMMERCIAL

## 2025-07-25 VITALS
DIASTOLIC BLOOD PRESSURE: 85 MMHG | BODY MASS INDEX: 38.3 KG/M2 | TEMPERATURE: 97.8 F | HEIGHT: 74 IN | SYSTOLIC BLOOD PRESSURE: 138 MMHG | HEART RATE: 70 BPM | RESPIRATION RATE: 20 BRPM | OXYGEN SATURATION: 98 % | WEIGHT: 298.4 LBS

## 2025-07-25 DIAGNOSIS — J30.9 ALLERGIC RHINITIS, UNSPECIFIED SEASONALITY, UNSPECIFIED TRIGGER: ICD-10-CM

## 2025-07-25 DIAGNOSIS — G47.33 MODERATE OBSTRUCTIVE SLEEP APNEA: Primary | ICD-10-CM

## 2025-07-25 PROCEDURE — 99213 OFFICE O/P EST LOW 20 MIN: CPT | Performed by: FAMILY MEDICINE

## 2025-07-25 RX ORDER — LEVOCETIRIZINE DIHYDROCHLORIDE 5 MG/1
5 TABLET, FILM COATED ORAL EVERY EVENING
Qty: 90 TABLET | Refills: 3 | Status: SHIPPED | OUTPATIENT
Start: 2025-07-25

## 2025-07-25 NOTE — ASSESSMENT & PLAN NOTE
Patient is experiencing complications of treatment.  We will attempt to use antihistamines and their anticholinergic effects to provide some dryness at night.  Initially Allegra will be changed to Xyzal.  If this is ineffective could consider use of Levsin, oxybutynin.  May also benefit from sleep medicine consultation for this specific problem.  Patient is currently paying out-of-pocket for Zepbound and goal should be to titrate up to 10 mg.  With significant weight loss need for CPAP could possibly decrease or be eliminated.       in addition to the above his minimum pressure will be lowered to 7 mmHg.

## 2025-07-25 NOTE — PROGRESS NOTES
"Chief Complaint   Patient presents with    FU on sleep apnea     Allergic Rhinitis     Would like to switch from Allegra to Xyzal        Subjective      Serge Narayanan is a 39 y.o. who presents for RANDA f/u. He falls sleep without complication. He has tried 6 different masks which cover his mouth. The straps that wrapped around his head were uncomfortable to wear causing headache. He is now using a Bleep nasal mask and using mouth tape to keep his mouth closed. Since he has begun using the mouth tape or paper tape to keep his mouth closed but has started drooling more. His [employer] has also suggested lowering of the pressure to 7 mmHg. He has tried a mouth piece to help with keeping his mouth closed but that was ineffective.    Objective   Vital Signs:  /85   Pulse 70   Temp 97.8 °F (36.6 °C)   Resp 20   Ht 188 cm (74.02\")   Wt 135 kg (298 lb 6.4 oz)   SpO2 98%   BMI 38.30 kg/m²     Physical Exam  Vitals reviewed.   Constitutional:       Appearance: Normal appearance.   Neurological:      Mental Status: He is alert.          Result Review                     Assessment and Plan  Diagnoses and all orders for this visit:    1. Moderate obstructive sleep apnea (Primary)  Assessment & Plan:  Patient is experiencing complications of treatment.  We will attempt to use antihistamines and their anticholinergic effects to provide some dryness at night.  Initially Allegra will be changed to Xyzal.  If this is ineffective could consider use of Levsin, oxybutynin.  May also benefit from sleep medicine consultation for this specific problem.  Patient is currently paying out-of-pocket for Zepbound and goal should be to titrate up to 10 mg.  With significant weight loss need for CPAP could possibly decrease or be eliminated.       in addition to the above his minimum pressure will be lowered to 7 mmHg.      2. Allergic rhinitis, unspecified seasonality, unspecified trigger  -     " levocetirizine (XYZAL) 5 MG tablet; Take 1 tablet by mouth Every Evening.  Dispense: 90 tablet; Refill: 3            Follow Up  No follow-ups on file.  Patient was given instructions and counseling regarding his condition or for health maintenance advice. Please see specific information pulled into the AVS if appropriate.

## 2025-08-18 ENCOUNTER — PATIENT MESSAGE (OUTPATIENT)
Dept: FAMILY MEDICINE CLINIC | Facility: CLINIC | Age: 40
End: 2025-08-18
Payer: COMMERCIAL

## 2025-08-18 DIAGNOSIS — E66.812 CLASS 2 SEVERE OBESITY DUE TO EXCESS CALORIES WITH SERIOUS COMORBIDITY AND BODY MASS INDEX (BMI) OF 39.0 TO 39.9 IN ADULT: Primary | ICD-10-CM

## 2025-08-18 DIAGNOSIS — E66.01 CLASS 2 SEVERE OBESITY DUE TO EXCESS CALORIES WITH SERIOUS COMORBIDITY AND BODY MASS INDEX (BMI) OF 39.0 TO 39.9 IN ADULT: Primary | ICD-10-CM

## 2025-08-20 RX ORDER — TIRZEPATIDE 7.5 MG/.5ML
7.5 INJECTION, SOLUTION SUBCUTANEOUS WEEKLY
Qty: 2 ML | Refills: 0 | Status: SHIPPED | OUTPATIENT
Start: 2025-08-20